# Patient Record
Sex: MALE | Race: OTHER | HISPANIC OR LATINO | URBAN - METROPOLITAN AREA
[De-identification: names, ages, dates, MRNs, and addresses within clinical notes are randomized per-mention and may not be internally consistent; named-entity substitution may affect disease eponyms.]

---

## 2019-01-22 ENCOUNTER — OUTPATIENT (OUTPATIENT)
Dept: OUTPATIENT SERVICES | Facility: HOSPITAL | Age: 44
LOS: 1 days | End: 2019-01-22
Payer: COMMERCIAL

## 2019-01-22 VITALS
TEMPERATURE: 98 F | HEART RATE: 59 BPM | OXYGEN SATURATION: 96 % | HEIGHT: 71 IN | DIASTOLIC BLOOD PRESSURE: 66 MMHG | SYSTOLIC BLOOD PRESSURE: 125 MMHG | RESPIRATION RATE: 16 BRPM | WEIGHT: 287.92 LBS

## 2019-01-22 DIAGNOSIS — R94.39 ABNORMAL RESULT OF OTHER CARDIOVASCULAR FUNCTION STUDY: ICD-10-CM

## 2019-01-22 DIAGNOSIS — S43.006A UNSPECIFIED DISLOCATION OF UNSPECIFIED SHOULDER JOINT, INITIAL ENCOUNTER: Chronic | ICD-10-CM

## 2019-01-22 DIAGNOSIS — T14.8XXA OTHER INJURY OF UNSPECIFIED BODY REGION, INITIAL ENCOUNTER: Chronic | ICD-10-CM

## 2019-01-22 LAB
ALBUMIN SERPL ELPH-MCNC: 4.2 G/DL — SIGNIFICANT CHANGE UP (ref 3.3–5)
ALP SERPL-CCNC: 52 U/L — SIGNIFICANT CHANGE UP (ref 40–120)
ALT FLD-CCNC: 23 U/L — SIGNIFICANT CHANGE UP (ref 10–45)
ANION GAP SERPL CALC-SCNC: 11 MMOL/L — SIGNIFICANT CHANGE UP (ref 5–17)
ANION GAP SERPL CALC-SCNC: 13 MMOL/L — SIGNIFICANT CHANGE UP (ref 5–17)
AST SERPL-CCNC: 39 U/L — SIGNIFICANT CHANGE UP (ref 10–40)
BILIRUB SERPL-MCNC: 0.4 MG/DL — SIGNIFICANT CHANGE UP (ref 0.2–1.2)
BUN SERPL-MCNC: 23 MG/DL — SIGNIFICANT CHANGE UP (ref 7–23)
BUN SERPL-MCNC: 23 MG/DL — SIGNIFICANT CHANGE UP (ref 7–23)
CALCIUM SERPL-MCNC: 9.3 MG/DL — SIGNIFICANT CHANGE UP (ref 8.4–10.5)
CALCIUM SERPL-MCNC: 9.3 MG/DL — SIGNIFICANT CHANGE UP (ref 8.4–10.5)
CHLORIDE SERPL-SCNC: 100 MMOL/L — SIGNIFICANT CHANGE UP (ref 96–108)
CHLORIDE SERPL-SCNC: 101 MMOL/L — SIGNIFICANT CHANGE UP (ref 96–108)
CO2 SERPL-SCNC: 28 MMOL/L — SIGNIFICANT CHANGE UP (ref 22–31)
CO2 SERPL-SCNC: 33 MMOL/L — HIGH (ref 22–31)
CREAT SERPL-MCNC: 1.19 MG/DL — SIGNIFICANT CHANGE UP (ref 0.5–1.3)
CREAT SERPL-MCNC: 1.36 MG/DL — HIGH (ref 0.5–1.3)
GLUCOSE SERPL-MCNC: 94 MG/DL — SIGNIFICANT CHANGE UP (ref 70–99)
GLUCOSE SERPL-MCNC: 97 MG/DL — SIGNIFICANT CHANGE UP (ref 70–99)
HCT VFR BLD CALC: 40 % — SIGNIFICANT CHANGE UP (ref 39–50)
HGB BLD-MCNC: 13.9 G/DL — SIGNIFICANT CHANGE UP (ref 13–17)
MCHC RBC-ENTMCNC: 30.2 PG — SIGNIFICANT CHANGE UP (ref 27–34)
MCHC RBC-ENTMCNC: 34.8 GM/DL — SIGNIFICANT CHANGE UP (ref 32–36)
MCV RBC AUTO: 87 FL — SIGNIFICANT CHANGE UP (ref 80–100)
PLATELET # BLD AUTO: 336 K/UL — SIGNIFICANT CHANGE UP (ref 150–400)
POTASSIUM SERPL-MCNC: 3.1 MMOL/L — LOW (ref 3.5–5.3)
POTASSIUM SERPL-MCNC: 4.9 MMOL/L — SIGNIFICANT CHANGE UP (ref 3.5–5.3)
POTASSIUM SERPL-SCNC: 3.1 MMOL/L — LOW (ref 3.5–5.3)
POTASSIUM SERPL-SCNC: 4.9 MMOL/L — SIGNIFICANT CHANGE UP (ref 3.5–5.3)
PROT SERPL-MCNC: 7.3 G/DL — SIGNIFICANT CHANGE UP (ref 6–8.3)
RBC # BLD: 4.6 M/UL — SIGNIFICANT CHANGE UP (ref 4.2–5.8)
RBC # FLD: 12.3 % — SIGNIFICANT CHANGE UP (ref 10.3–14.5)
SODIUM SERPL-SCNC: 142 MMOL/L — SIGNIFICANT CHANGE UP (ref 135–145)
SODIUM SERPL-SCNC: 144 MMOL/L — SIGNIFICANT CHANGE UP (ref 135–145)
WBC # BLD: 10.6 K/UL — HIGH (ref 3.8–10.5)
WBC # FLD AUTO: 10.6 K/UL — HIGH (ref 3.8–10.5)

## 2019-01-22 PROCEDURE — C1769: CPT

## 2019-01-22 PROCEDURE — 85027 COMPLETE CBC AUTOMATED: CPT

## 2019-01-22 PROCEDURE — C1887: CPT

## 2019-01-22 PROCEDURE — 93454 CORONARY ARTERY ANGIO S&I: CPT

## 2019-01-22 PROCEDURE — 93005 ELECTROCARDIOGRAM TRACING: CPT

## 2019-01-22 PROCEDURE — 93454 CORONARY ARTERY ANGIO S&I: CPT | Mod: 26,GC

## 2019-01-22 PROCEDURE — 93010 ELECTROCARDIOGRAM REPORT: CPT

## 2019-01-22 PROCEDURE — C1894: CPT

## 2019-01-22 PROCEDURE — 80053 COMPREHEN METABOLIC PANEL: CPT

## 2019-01-22 PROCEDURE — 99152 MOD SED SAME PHYS/QHP 5/>YRS: CPT | Mod: GC

## 2019-01-22 PROCEDURE — 99152 MOD SED SAME PHYS/QHP 5/>YRS: CPT

## 2019-01-22 PROCEDURE — 80048 BASIC METABOLIC PNL TOTAL CA: CPT

## 2019-01-22 RX ORDER — POTASSIUM CHLORIDE 20 MEQ
40 PACKET (EA) ORAL ONCE
Qty: 0 | Refills: 0 | Status: DISCONTINUED | OUTPATIENT
Start: 2019-01-22 | End: 2019-02-06

## 2019-01-22 NOTE — H&P CARDIOLOGY - HISTORY OF PRESENT ILLNESS
44 years old male with PMHx HTN, HLD (not started on statin), family Hx of CAD (father with CABG at age 40's) who had intermittent dyspnea at rest for 2 weeks and was evaluated by  Dr Canales and underwent stress test that shewed small sized defect in the anterior wall segment of mild intensity with stress gated hypokinesis, consistent with mild anterior ischemia. Pt referred for [facility] for further cardiac evaluation.  Pt denies any chest discomfort, dizziness, HA or LE swelling but reports 40 pound weight gain over few years.     Cards Sigifredo Comer

## 2019-04-05 ENCOUNTER — MEDICATION RENEWAL (OUTPATIENT)
Age: 44
End: 2019-04-05

## 2019-04-08 ENCOUNTER — MEDICATION RENEWAL (OUTPATIENT)
Age: 44
End: 2019-04-08

## 2019-04-08 RX ORDER — CHOLECALCIFEROL (VITAMIN D3) 50 MCG
50 MCG TABLET ORAL
Qty: 30 | Refills: 0 | Status: ACTIVE | COMMUNITY
Start: 2019-04-08 | End: 1900-01-01

## 2019-05-22 ENCOUNTER — MEDICATION RENEWAL (OUTPATIENT)
Age: 44
End: 2019-05-22

## 2019-05-29 PROBLEM — E78.5 HYPERLIPIDEMIA, UNSPECIFIED: Chronic | Status: ACTIVE | Noted: 2019-01-22

## 2019-06-19 ENCOUNTER — MEDICATION RENEWAL (OUTPATIENT)
Age: 44
End: 2019-06-19

## 2019-07-02 ENCOUNTER — LABORATORY RESULT (OUTPATIENT)
Age: 44
End: 2019-07-02

## 2019-07-02 ENCOUNTER — APPOINTMENT (OUTPATIENT)
Dept: CARDIOLOGY | Facility: CLINIC | Age: 44
End: 2019-07-02
Payer: COMMERCIAL

## 2019-07-02 VITALS
WEIGHT: 292 LBS | HEIGHT: 71 IN | HEART RATE: 63 BPM | OXYGEN SATURATION: 97 % | DIASTOLIC BLOOD PRESSURE: 120 MMHG | SYSTOLIC BLOOD PRESSURE: 160 MMHG | BODY MASS INDEX: 40.88 KG/M2

## 2019-07-02 DIAGNOSIS — R53.83 OTHER FATIGUE: ICD-10-CM

## 2019-07-02 DIAGNOSIS — H11.30 CONJUNCTIVAL HEMORRHAGE, UNSPECIFIED EYE: ICD-10-CM

## 2019-07-02 DIAGNOSIS — Z82.49 FAMILY HISTORY OF ISCHEMIC HEART DISEASE AND OTHER DISEASES OF THE CIRCULATORY SYSTEM: ICD-10-CM

## 2019-07-02 DIAGNOSIS — F17.210 NICOTINE DEPENDENCE, CIGARETTES, UNCOMPLICATED: ICD-10-CM

## 2019-07-02 PROCEDURE — 99214 OFFICE O/P EST MOD 30 MIN: CPT

## 2019-07-02 PROCEDURE — 93000 ELECTROCARDIOGRAM COMPLETE: CPT

## 2019-07-02 NOTE — PHYSICAL EXAM
[General Appearance - Well Developed] : well developed [Normal Appearance] : normal appearance [Well Groomed] : well groomed [General Appearance - Well Nourished] : well nourished [No Deformities] : no deformities [General Appearance - In No Acute Distress] : no acute distress [Eyelids - No Xanthelasma] : the eyelids demonstrated no xanthelasmas [Normal Oral Mucosa] : normal oral mucosa [No Oral Cyanosis] : no oral cyanosis [No Oral Pallor] : no oral pallor [Normal Jugular Venous A Waves Present] : normal jugular venous A waves present [Normal Jugular Venous V Waves Present] : normal jugular venous V waves present [No Jugular Venous Muñiz A Waves] : no jugular venous muñiz A waves [Heart Rate And Rhythm] : heart rate and rhythm were normal [Heart Sounds] : normal S1 and S2 [Respiration, Rhythm And Depth] : normal respiratory rhythm and effort [Exaggerated Use Of Accessory Muscles For Inspiration] : no accessory muscle use [Auscultation Breath Sounds / Voice Sounds] : lungs were clear to auscultation bilaterally [Abdomen Soft] : soft [Abdomen Tenderness] : non-tender [Abdomen Mass (___ Cm)] : no abdominal mass palpated [Abnormal Walk] : normal gait [Nail Clubbing] : no clubbing of the fingernails [Gait - Sufficient For Exercise Testing] : the gait was sufficient for exercise testing [Cyanosis, Localized] : no localized cyanosis [Petechial Hemorrhages (___cm)] : no petechial hemorrhages [Skin Color & Pigmentation] : normal skin color and pigmentation [] : no rash [No Venous Stasis] : no venous stasis [Skin Lesions] : no skin lesions [No Skin Ulcers] : no skin ulcer [Oriented To Time, Place, And Person] : oriented to person, place, and time [No Xanthoma] : no  xanthoma was observed [Mood] : the mood was normal [Affect] : the affect was normal [No Anxiety] : not feeling anxious [FreeTextEntry1] : Non-displaced PMI, chest non-tender; regular rate and rhythm,  normal S1, S2 , no rubs, or gallops, Grade 2/6 holosystolic murmur @ LSB w/o radiation.

## 2019-07-02 NOTE — HISTORY OF PRESENT ILLNESS
[FreeTextEntry1] : The patient here for evaluation of high blood pressure. Patient is currently tolerating the current antihypertensive regime and they deny headaches, stiff neck, visual changes, or PND. The patient has been trying to stay on a low-sodium diet. Pt has not taken minoxidil or toprol for 1 week. \par Patient also presents today for follow-up of obesity. The patient states they have not lost significant weight since the last visit. Patient is currently obese and remains sedentary. The patient has not been compliant with medical follow-up instructions for weight-loss and exercise since the last visit.\par The patient is also here for complaints  of joint pain including back and knee pain.  Patient denies any severe limitations currently from the chronic arthritic intermittent flareups.\par Pt states on Sunday he noticed burst blood vessel in right eye. Denies pain, decreased or blurry vision. \par

## 2019-07-05 LAB
25(OH)D3 SERPL-MCNC: 33.4 NG/ML
ALBUMIN SERPL ELPH-MCNC: 4.6 G/DL
ALP BLD-CCNC: 68 U/L
ALT SERPL-CCNC: 18 U/L
ANION GAP SERPL CALC-SCNC: 13 MMOL/L
APPEARANCE: CLEAR
AST SERPL-CCNC: 13 U/L
BACTERIA: NEGATIVE
BASOPHILS # BLD AUTO: 0.07 K/UL
BASOPHILS NFR BLD AUTO: 0.7 %
BILIRUB SERPL-MCNC: 0.4 MG/DL
BILIRUBIN URINE: NEGATIVE
BLOOD URINE: NEGATIVE
BUN SERPL-MCNC: 13 MG/DL
CALCIUM SERPL-MCNC: 9.5 MG/DL
CHLORIDE SERPL-SCNC: 103 MMOL/L
CHOLEST SERPL-MCNC: 156 MG/DL
CHOLEST/HDLC SERPL: 3.8 RATIO
CO2 SERPL-SCNC: 28 MMOL/L
COLOR: YELLOW
CREAT SERPL-MCNC: 1.3 MG/DL
EOSINOPHIL # BLD AUTO: 0.21 K/UL
EOSINOPHIL NFR BLD AUTO: 2.1 %
ESTIMATED AVERAGE GLUCOSE: 94 MG/DL
FERRITIN SERPL-MCNC: 164 NG/ML
FOLATE RBC-MCNC: 1179 NG/ML
FOLATE SERPL-MCNC: 13.2 NG/ML
GLUCOSE QUALITATIVE U: NEGATIVE
GLUCOSE SERPL-MCNC: 98 MG/DL
HAPTOGLOB SERPL-MCNC: 159 MG/DL
HBA1C MFR BLD HPLC: 4.9 %
HCT VFR BLD CALC: 43.1 %
HCT VFR BLD CALC: 43.1 %
HDLC SERPL-MCNC: 41 MG/DL
HGB BLD-MCNC: 14.4 G/DL
HYALINE CASTS: 1 /LPF
IMM GRANULOCYTES NFR BLD AUTO: 0.5 %
IRON SERPL-MCNC: 60 UG/DL
KETONES URINE: NEGATIVE
LDH SERPL-CCNC: 189 U/L
LDLC SERPL CALC-MCNC: 83 MG/DL
LEUKOCYTE ESTERASE URINE: NEGATIVE
LYMPHOCYTES # BLD AUTO: 1.91 K/UL
LYMPHOCYTES NFR BLD AUTO: 18.7 %
MAGNESIUM SERPL-MCNC: 2.2 MG/DL
MAN DIFF?: NORMAL
MCHC RBC-ENTMCNC: 30 PG
MCHC RBC-ENTMCNC: 33.4 GM/DL
MCV RBC AUTO: 89.8 FL
MICROSCOPIC-UA: NORMAL
MONOCYTES # BLD AUTO: 0.71 K/UL
MONOCYTES NFR BLD AUTO: 6.9 %
NEUTROPHILS # BLD AUTO: 7.28 K/UL
NEUTROPHILS NFR BLD AUTO: 71.1 %
NITRITE URINE: NEGATIVE
PH URINE: 6
PHOSPHATE SERPL-MCNC: 3.2 MG/DL
PLATELET # BLD AUTO: 331 K/UL
POTASSIUM SERPL-SCNC: 3.7 MMOL/L
PROT SERPL-MCNC: 7.2 G/DL
PROTEIN URINE: ABNORMAL
PSA SERPL-MCNC: 0.22 NG/ML
RBC # BLD: 4.8 M/UL
RBC # FLD: 14.1 %
RED BLOOD CELLS URINE: 2 /HPF
SODIUM SERPL-SCNC: 144 MMOL/L
SPECIFIC GRAVITY URINE: 1.02
SQUAMOUS EPITHELIAL CELLS: 1 /HPF
T3RU NFR SERPL: 1 TBI
T4 FREE SERPL-MCNC: 1.3 NG/DL
T4 SERPL-MCNC: 7.6 UG/DL
TRANSFERRIN SERPL-MCNC: 233 MG/DL
TRIGL SERPL-MCNC: 158 MG/DL
TSH SERPL-ACNC: 1.6 UIU/ML
URATE SERPL-MCNC: 7.8 MG/DL
UROBILINOGEN URINE: NORMAL
VIT B12 SERPL-MCNC: 402 PG/ML
WBC # FLD AUTO: 10.23 K/UL
WHITE BLOOD CELLS URINE: 1 /HPF

## 2019-07-08 LAB
VIT B1 SERPL-MCNC: 150.9 NMOL/L
VIT B6 SERPL-MCNC: 14.6 UG/L

## 2019-07-12 ENCOUNTER — APPOINTMENT (OUTPATIENT)
Dept: SURGERY | Facility: CLINIC | Age: 44
End: 2019-07-12
Payer: COMMERCIAL

## 2019-07-12 VITALS
HEIGHT: 71 IN | HEART RATE: 64 BPM | DIASTOLIC BLOOD PRESSURE: 128 MMHG | BODY MASS INDEX: 39.76 KG/M2 | TEMPERATURE: 98.5 F | WEIGHT: 284 LBS | OXYGEN SATURATION: 98 % | SYSTOLIC BLOOD PRESSURE: 203 MMHG | RESPIRATION RATE: 15 BRPM

## 2019-07-12 DIAGNOSIS — Z78.9 OTHER SPECIFIED HEALTH STATUS: ICD-10-CM

## 2019-07-12 PROCEDURE — 99244 OFF/OP CNSLTJ NEW/EST MOD 40: CPT

## 2019-07-12 NOTE — PLAN
[FreeTextEntry1] : I have reviewed all surgical and nonsurgical options with the patient, who would like to proceed with sleeve gastrectomy. Based on my assessment, I feel he is a reasonable candidate for laparoscopic sleeve gastrectomy. We have reviewed all the risks and benefits, including the risk of bleeding, leak, infection, sepsis, malnutrition, reflux, vomiting, and weight regain. We have also discussed the need for  lifelong vitamin supplementation, as well as the need for strict adherence to an appropriate bariatric diet postoperatively and maintaining an active lifestyle. I reviewed the importance of behavioral modification and regular follow-up in order to optimize outcomes and avoid complications.  We discussed at length the requirement for him to quit smoking in order to decrease perioperative complications.  He is willing to do this.  All questions and concerns were addressed. \par The patient will undergo laparoscopic sleeve gastrectomy after completion of medical weight management program.\par Requires:\par [] medical clearance\par [] cardiology clearance\par [] pulmonary clearance\par [] UGIS\par [] upper endoscopy\par

## 2019-07-12 NOTE — CONSULT LETTER
[Dear  ___] : Dear  [unfilled], [Please see my note below.] : Please see my note below. [Consult Letter:] : I had the pleasure of evaluating your patient, [unfilled]. [Consult Closing:] : Thank you very much for allowing me to participate in the care of this patient.  If you have any questions, please do not hesitate to contact me. [Sincerely,] : Sincerely, [FreeTextEntry2] : Dr. Sigifredo Canales [FreeTextEntry3] : Van Chow MD, FACS\par [facility]\par [address]\[place] [address]\par tel. [phone]\par fax [phone] [FreeTextEntry1] : As you know, he is a very pleasant 44-year-old gentleman who has been struggling with obesity and is interested in bariatric surgery.  I think he would be a reasonable candidate for sleeve gastrectomy.  He understands that he must take an effort to quit smoking.

## 2019-07-12 NOTE — ASSESSMENT
[FreeTextEntry1] : The patient is a 44 year-old gentleman with BMI of 40 kg/m2, which places him in the morbidly obese category.  This has directly contributed to his current medical conditions as well as a decreased quality of life.  Comorbidities include hypertension.  He has very little chance of successfully losing and maintaining a significant amount of weight with non-surgical management, and would benefit from surgical intervention.  The patient meets the criteria for weight loss surgery as defined in the NIH consensus statement.

## 2019-07-12 NOTE — REVIEW OF SYSTEMS
[Negative] : Allergic/Immunologic [Lower Ext Edema] : lower extremity edema [Shortness Of Breath] : shortness of breath

## 2019-07-12 NOTE — REASON FOR VISIT
[Initial Consult] : an initial consult for [Morbid Obesity (BMI>40)] : morbid obesity (bmi>40) [FreeTextEntry2] : referred by Dr. Sigifredo Canales

## 2019-07-12 NOTE — HISTORY OF PRESENT ILLNESS
[de-identified] : Balta is a 45 y/o male here for a consultation for weight loss surgery.\par \par The patient has been struggling with obesity for many years, since his 20s.  He has attempted several diet and exercise programs without success.  The excess weight is starting to significantly affect his health and lifestyle.  He has experienced worsening joint pain, shortness of breath, and fatigue.  He has hypertension that is poorly controlled. \par He is an active smoker, smoking 4-5 cigarettes per day.  He drinks a case of beer on weekends.  He works as a .\par \par Medical history is significant for hypertension and cardiac murmur.\par Surgical history is significant for surgery.\par The patient reports no prior history of blood clot or abnormal bleeding.\par The patient has no prior history of dysphagia.\par \par Referred by Dr. Sigifredo Doherty.\par

## 2019-07-12 NOTE — PHYSICAL EXAM
[Obese, well nourished, in no acute distress] : obese, well nourished, in no acute distress [Normal] : affect appropriate [de-identified] : Breathing comfortably [de-identified] : Soft, nontender, obese.

## 2019-07-15 ENCOUNTER — APPOINTMENT (OUTPATIENT)
Dept: CARDIOLOGY | Facility: CLINIC | Age: 44
End: 2019-07-15
Payer: COMMERCIAL

## 2019-07-15 PROCEDURE — 93306 TTE W/DOPPLER COMPLETE: CPT

## 2019-08-08 ENCOUNTER — APPOINTMENT (OUTPATIENT)
Dept: CARDIOLOGY | Facility: CLINIC | Age: 44
End: 2019-08-08
Payer: COMMERCIAL

## 2019-08-08 ENCOUNTER — NON-APPOINTMENT (OUTPATIENT)
Age: 44
End: 2019-08-08

## 2019-08-08 VITALS
SYSTOLIC BLOOD PRESSURE: 160 MMHG | DIASTOLIC BLOOD PRESSURE: 90 MMHG | BODY MASS INDEX: 39.62 KG/M2 | HEIGHT: 71 IN | WEIGHT: 283 LBS | OXYGEN SATURATION: 97 % | HEART RATE: 59 BPM

## 2019-08-08 PROCEDURE — 93000 ELECTROCARDIOGRAM COMPLETE: CPT

## 2019-08-08 PROCEDURE — 99213 OFFICE O/P EST LOW 20 MIN: CPT

## 2019-08-09 ENCOUNTER — APPOINTMENT (OUTPATIENT)
Dept: PULMONOLOGY | Facility: CLINIC | Age: 44
End: 2019-08-09

## 2019-08-17 NOTE — HISTORY OF PRESENT ILLNESS
[FreeTextEntry1] : I was asked to see this delightful patient today for Pre-op Evaluation  prior to  bariatric surgery with   Dr. Van Bernal at fax number [phone].  The patient denies fever, cough, wheezing, sputum production, hemoptysis, dyspnea, congestion, diarrhea, constipation, nausea, vomiting, bright red blood per rectum, melena, angina, chest pain, palpitations, diaphoresis, PND, incontinence, frequency, urgency, dysuria, edema, joint pain, headache, weakness, numbness and dizziness. The date of the planned procedure is scheduled for February/March 2019.\par \par The patient here for evaluation of high blood pressure. Patient is currently tolerating the current antihypertensive regime and they deny headaches, stiff neck, visual changes, or PND. The patient has been trying to stay on a low-sodium diet.\par \par Nuclear stress test was done in Spring 2019\par \par

## 2019-08-17 NOTE — PHYSICAL EXAM
[General Appearance - Well Developed] : well developed [Normal Appearance] : normal appearance [No Deformities] : no deformities [General Appearance - Well Nourished] : well nourished [Well Groomed] : well groomed [Normal Conjunctiva] : the conjunctiva exhibited no abnormalities [General Appearance - In No Acute Distress] : no acute distress [Eyelids - No Xanthelasma] : the eyelids demonstrated no xanthelasmas [Normal Oral Mucosa] : normal oral mucosa [No Oral Pallor] : no oral pallor [No Oral Cyanosis] : no oral cyanosis [Normal Jugular Venous V Waves Present] : normal jugular venous V waves present [Normal Jugular Venous A Waves Present] : normal jugular venous A waves present [No Jugular Venous Muñiz A Waves] : no jugular venous muñiz A waves [Respiration, Rhythm And Depth] : normal respiratory rhythm and effort [Exaggerated Use Of Accessory Muscles For Inspiration] : no accessory muscle use [Auscultation Breath Sounds / Voice Sounds] : lungs were clear to auscultation bilaterally [Heart Rate And Rhythm] : heart rate and rhythm were normal [Heart Sounds] : normal S1 and S2 [Murmurs] : no murmurs present [Abdomen Soft] : soft [Abdomen Tenderness] : non-tender [Abdomen Mass (___ Cm)] : no abdominal mass palpated [Abnormal Walk] : normal gait [Gait - Sufficient For Exercise Testing] : the gait was sufficient for exercise testing [Nail Clubbing] : no clubbing of the fingernails [Cyanosis, Localized] : no localized cyanosis [Petechial Hemorrhages (___cm)] : no petechial hemorrhages [Skin Color & Pigmentation] : normal skin color and pigmentation [No Venous Stasis] : no venous stasis [] : no rash [No Skin Ulcers] : no skin ulcer [Skin Lesions] : no skin lesions [Oriented To Time, Place, And Person] : oriented to person, place, and time [No Xanthoma] : no  xanthoma was observed [Affect] : the affect was normal [Mood] : the mood was normal [No Anxiety] : not feeling anxious [FreeTextEntry1] : Regular rate and rhythm, NL S1, S2, non-displaced PMI, chest non-tender; no rubs,heaves  or gallops a  Grade 2/6 systolic murmur noted at the LSB

## 2019-10-09 ENCOUNTER — EMERGENCY (EMERGENCY)
Facility: HOSPITAL | Age: 44
LOS: 0 days | Discharge: HOME | End: 2019-10-09
Attending: EMERGENCY MEDICINE
Payer: OTHER MISCELLANEOUS

## 2019-10-09 VITALS
WEIGHT: 276.02 LBS | RESPIRATION RATE: 16 BRPM | TEMPERATURE: 99 F | OXYGEN SATURATION: 96 % | DIASTOLIC BLOOD PRESSURE: 122 MMHG | SYSTOLIC BLOOD PRESSURE: 182 MMHG | HEIGHT: 71 IN | HEART RATE: 73 BPM

## 2019-10-09 VITALS
OXYGEN SATURATION: 99 % | SYSTOLIC BLOOD PRESSURE: 183 MMHG | RESPIRATION RATE: 17 BRPM | TEMPERATURE: 99 F | HEART RATE: 62 BPM | DIASTOLIC BLOOD PRESSURE: 103 MMHG

## 2019-10-09 DIAGNOSIS — S43.006A UNSPECIFIED DISLOCATION OF UNSPECIFIED SHOULDER JOINT, INITIAL ENCOUNTER: Chronic | ICD-10-CM

## 2019-10-09 DIAGNOSIS — M25.571 PAIN IN RIGHT ANKLE AND JOINTS OF RIGHT FOOT: ICD-10-CM

## 2019-10-09 DIAGNOSIS — F17.210 NICOTINE DEPENDENCE, CIGARETTES, UNCOMPLICATED: ICD-10-CM

## 2019-10-09 DIAGNOSIS — I10 ESSENTIAL (PRIMARY) HYPERTENSION: ICD-10-CM

## 2019-10-09 DIAGNOSIS — M10.9 GOUT, UNSPECIFIED: ICD-10-CM
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
## 2019-10-09 DIAGNOSIS — S93.492A SPRAIN OF OTHER LIGAMENT OF LEFT ANKLE, INITIAL ENCOUNTER: ICD-10-CM
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
## 2019-10-09 DIAGNOSIS — T14.8XXA OTHER INJURY OF UNSPECIFIED BODY REGION, INITIAL ENCOUNTER: Chronic | ICD-10-CM

## 2019-10-09 PROCEDURE — 99283 EMERGENCY DEPT VISIT LOW MDM: CPT

## 2019-10-09 PROCEDURE — 73610 X-RAY EXAM OF ANKLE: CPT | Mod: 26,RT

## 2019-10-09 RX ORDER — NICOTINE POLACRILEX 2 MG
1 GUM BUCCAL
Qty: 30 | Refills: 0
Start: 2019-10-09 | End: 2019-11-07

## 2019-10-09 RX ORDER — IBUPROFEN 200 MG
600 TABLET ORAL ONCE
Refills: 0 | Status: COMPLETED | OUTPATIENT
Start: 2019-10-09 | End: 2019-10-09

## 2019-10-09 RX ADMIN — Medication 600 MILLIGRAM(S): at 09:26

## 2019-10-09 NOTE — ED PROVIDER NOTE - PATIENT PORTAL LINK FT
You can access the FollowMyHealth Patient Portal offered by [facility] by registering at the following website: [email or web address]. By joining [facility]’s FollowMyHealth portal, you will also be able to view your health information using other applications (apps) compatible with our system.

## 2019-10-09 NOTE — ED PROVIDER NOTE - OBJECTIVE STATEMENT
Pt is a 45 y/o male with a PMHx of HTN and HLD, who presents to the ER for ankle pain/injury. Pt states Pt is a 45 y/o male with a PMHx of HTN and HLD, and gout  who presents to the ER for ankle pain/injury. 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 He states the pain began as a 7/10, and has worsened to a 9/10 as of today. He also notes edema in the LLE, which has worsened since Monday. +Smoker, denies use of other illict drugs or h/o alcohol abuse.

## 2019-10-09 NOTE — ED PROVIDER NOTE - RELIEVING FACTORS
Detail Level: Simple Initiate Treatment: Metronidozale BID, sulfacleanse BID, Doxycycline 75mg daily x 1 month, then 1/2 tab daily from there on out Action 2: Continue Continue Regimen: Fluocinonide solution for scalp as needed Detail Level: Zone Discontinue Regimen: Enstilar Initiate Regimen: Clobetasol ointment for hands and elbows as needed none

## 2019-10-09 NOTE — ED ADULT NURSE NOTE - NSIMPLEMENTINTERV_GEN_ALL_ED
Implemented All Universal Safety Interventions:  [place] to call system. Call bell, personal items and telephone within reach. Instruct patient to call for assistance. Room bathroom lighting operational. Non-slip footwear when patient is off stretcher. Physically safe environment: no spills, clutter or unnecessary equipment. Stretcher in lowest position, wheels locked, appropriate side rails in place.

## 2019-10-09 NOTE — ED PROVIDER NOTE - CLINICAL SUMMARY MEDICAL DECISION MAKING FREE TEXT BOX
Most likely ankle sprain. R/o fx   Pt BP uncontrolled, not on the first line for BP medication. Start HCTZ. Recommend smoking cessation. No symptoms secondary to end stage organ damage Most likely ankle sprain. R/o fx   Pt BP uncontrolled, not on the first line for BP medication. Start HCTZ. Recommend smoking cessation. No signs of end stage organ damage 2/2 to elevated bp, no further work up indicated. Most likely ankle sprain. R/o fx   Pt BP uncontrolled, not on the first line for BP medication. Start HCTZ. Recommend smoking cessation. No signs of end stage organ damage 2/2 to elevated bp, no further work up indicated.  xray neg. dc home

## 2019-10-09 NOTE — ED PROVIDER NOTE - NSFOLLOWUPINSTRUCTIONS_ED_ALL_ED_FT
Ice the ankle.     Rest the ankle. Ice the ankle.     Rest the ankle.    Start taking your new blood pressure medication AT NIGHT.    In fact, switch another blood pressure medication to the night as well so you have smoother control.    Avoid smoking!

## 2019-10-24 ENCOUNTER — NON-APPOINTMENT (OUTPATIENT)
Age: 44
End: 2019-10-24

## 2019-10-24 ENCOUNTER — APPOINTMENT (OUTPATIENT)
Dept: CARDIOLOGY | Facility: CLINIC | Age: 44
End: 2019-10-24
Payer: COMMERCIAL

## 2019-10-24 VITALS
BODY MASS INDEX: 39.62 KG/M2 | SYSTOLIC BLOOD PRESSURE: 140 MMHG | HEART RATE: 65 BPM | HEIGHT: 71 IN | OXYGEN SATURATION: 97 % | DIASTOLIC BLOOD PRESSURE: 100 MMHG | WEIGHT: 283 LBS

## 2019-10-24 DIAGNOSIS — R80.9 PROTEINURIA, UNSPECIFIED: ICD-10-CM

## 2019-10-24 LAB
ALBUMIN SERPL ELPH-MCNC: 4.3 G/DL
ALP BLD-CCNC: 75 U/L
ALT SERPL-CCNC: 11 U/L
ANION GAP SERPL CALC-SCNC: 13 MMOL/L
AST SERPL-CCNC: 12 U/L
BASOPHILS # BLD AUTO: 0.08 K/UL
BASOPHILS NFR BLD AUTO: 0.7 %
BILIRUB SERPL-MCNC: 0.3 MG/DL
BUN SERPL-MCNC: 24 MG/DL
CALCIUM SERPL-MCNC: 9.2 MG/DL
CHLORIDE SERPL-SCNC: 102 MMOL/L
CHOLEST SERPL-MCNC: 157 MG/DL
CHOLEST/HDLC SERPL: 4.4 RATIO
CO2 SERPL-SCNC: 29 MMOL/L
CREAT SERPL-MCNC: 1.25 MG/DL
EOSINOPHIL # BLD AUTO: 0.36 K/UL
EOSINOPHIL NFR BLD AUTO: 3.3 %
ESTIMATED AVERAGE GLUCOSE: 100 MG/DL
GLUCOSE SERPL-MCNC: 98 MG/DL
HBA1C MFR BLD HPLC: 5.1 %
HCT VFR BLD CALC: 40.8 %
HDLC SERPL-MCNC: 36 MG/DL
HGB BLD-MCNC: 13.5 G/DL
IMM GRANULOCYTES NFR BLD AUTO: 0.6 %
LDLC SERPL CALC-MCNC: 77 MG/DL
LYMPHOCYTES # BLD AUTO: 2.19 K/UL
LYMPHOCYTES NFR BLD AUTO: 20.4 %
MAGNESIUM SERPL-MCNC: 2.2 MG/DL
MAN DIFF?: NORMAL
MCHC RBC-ENTMCNC: 29.7 PG
MCHC RBC-ENTMCNC: 33.1 GM/DL
MCV RBC AUTO: 89.9 FL
MONOCYTES # BLD AUTO: 1 K/UL
MONOCYTES NFR BLD AUTO: 9.3 %
NEUTROPHILS # BLD AUTO: 7.06 K/UL
NEUTROPHILS NFR BLD AUTO: 65.7 %
PHOSPHATE SERPL-MCNC: 3.7 MG/DL
PLATELET # BLD AUTO: 345 K/UL
POTASSIUM SERPL-SCNC: 3.2 MMOL/L
PROT SERPL-MCNC: 6.8 G/DL
RBC # BLD: 4.54 M/UL
RBC # FLD: 14.4 %
SODIUM SERPL-SCNC: 144 MMOL/L
TRIGL SERPL-MCNC: 218 MG/DL
WBC # FLD AUTO: 10.75 K/UL

## 2019-10-24 PROCEDURE — 99214 OFFICE O/P EST MOD 30 MIN: CPT

## 2019-10-24 PROCEDURE — 93000 ELECTROCARDIOGRAM COMPLETE: CPT

## 2019-10-24 NOTE — PHYSICAL EXAM
[General Appearance - Well Developed] : well developed [Normal Appearance] : normal appearance [General Appearance - Well Nourished] : well nourished [No Deformities] : no deformities [Well Groomed] : well groomed [Normal Conjunctiva] : the conjunctiva exhibited no abnormalities [General Appearance - In No Acute Distress] : no acute distress [Normal Oral Mucosa] : normal oral mucosa [Eyelids - No Xanthelasma] : the eyelids demonstrated no xanthelasmas [No Oral Pallor] : no oral pallor [No Oral Cyanosis] : no oral cyanosis [No Jugular Venous Muñiz A Waves] : no jugular venous muñiz A waves [Normal Jugular Venous A Waves Present] : normal jugular venous A waves present [Normal Jugular Venous V Waves Present] : normal jugular venous V waves present [Respiration, Rhythm And Depth] : normal respiratory rhythm and effort [Exaggerated Use Of Accessory Muscles For Inspiration] : no accessory muscle use [Auscultation Breath Sounds / Voice Sounds] : lungs were clear to auscultation bilaterally [Heart Rate And Rhythm] : heart rate and rhythm were normal [Heart Sounds] : normal S1 and S2 [Abdomen Soft] : soft [Abdomen Tenderness] : non-tender [Abnormal Walk] : normal gait [Gait - Sufficient For Exercise Testing] : the gait was sufficient for exercise testing [Abdomen Mass (___ Cm)] : no abdominal mass palpated [Nail Clubbing] : no clubbing of the fingernails [Cyanosis, Localized] : no localized cyanosis [Petechial Hemorrhages (___cm)] : no petechial hemorrhages [Skin Color & Pigmentation] : normal skin color and pigmentation [No Venous Stasis] : no venous stasis [Skin Lesions] : no skin lesions [] : no rash [No Skin Ulcers] : no skin ulcer [No Xanthoma] : no  xanthoma was observed [Affect] : the affect was normal [Oriented To Time, Place, And Person] : oriented to person, place, and time [No Anxiety] : not feeling anxious [Mood] : the mood was normal

## 2019-10-25 LAB
ALDOSTERONE SERUM: 16.1 NG/DL
APPEARANCE: CLEAR
BACTERIA: NEGATIVE
BILIRUBIN URINE: NEGATIVE
BLOOD URINE: NEGATIVE
COLOR: NORMAL
GLUCOSE QUALITATIVE U: NEGATIVE
HYALINE CASTS: 0 /LPF
KETONES URINE: NEGATIVE
LEUKOCYTE ESTERASE URINE: NEGATIVE
MICROSCOPIC-UA: NORMAL
NITRITE URINE: NEGATIVE
PH URINE: 6
PROTEIN URINE: NEGATIVE
RED BLOOD CELLS URINE: 0 /HPF
RENIN PLASMA: 2.4 PG/ML
SPECIFIC GRAVITY URINE: 1.02
SQUAMOUS EPITHELIAL CELLS: 0 /HPF
UROBILINOGEN URINE: NORMAL
WHITE BLOOD CELLS URINE: 0 /HPF

## 2019-10-27 NOTE — HISTORY OF PRESENT ILLNESS
[FreeTextEntry1] : The patient here for evaluation of high blood pressure. Patient is currently tolerating the current antihypertensive regime and they deny headaches, stiff neck, visual changes, or PND. The patient has been trying to stay on a low-sodium diet. Pt admits he does not monitor his blood pressure at home. He states he was in the emergency room a few weeks ago for an ankle injury and his blood pressure was systolic 180. He states they added Hydralazine to his regimen. \par Patient also presents today for follow-up of obesity. The patient states they have not lost significant weight since the last visit. Patient is currently obese and remains sedentary. The patient has not been compliant with medical follow-up instructions for weight-loss and exercise since the last visit.\par Pt isn't sure he wants to follow through with bariatric surgery. He states he has been going to weight watchers and has lost 7 lbs. \par Pt states he has cut down on his smoking to a couple cigarettes a week.

## 2019-10-31 LAB
METANEPHRINE, PL: <10 PG/ML
NORMETANEPHRINE, PL: 25 PG/ML

## 2019-11-01 LAB
METANEPHRINE, PL: <10 PG/ML
NORMETANEPHRINE, PL: 151 PG/ML

## 2019-11-19 ENCOUNTER — APPOINTMENT (OUTPATIENT)
Dept: CARDIOLOGY | Facility: CLINIC | Age: 44
End: 2019-11-19

## 2019-11-22 ENCOUNTER — APPOINTMENT (OUTPATIENT)
Dept: NEPHROLOGY | Facility: CLINIC | Age: 44
End: 2019-11-22
Payer: COMMERCIAL

## 2019-11-22 VITALS — BODY MASS INDEX: 39.05 KG/M2 | WEIGHT: 279.98 LBS

## 2019-11-22 VITALS — DIASTOLIC BLOOD PRESSURE: 90 MMHG | SYSTOLIC BLOOD PRESSURE: 140 MMHG

## 2019-11-22 PROCEDURE — 99204 OFFICE O/P NEW MOD 45 MIN: CPT

## 2019-11-22 NOTE — PHYSICAL EXAM
[General Appearance - Alert] : alert [General Appearance - In No Acute Distress] : in no acute distress [General Appearance - Well Nourished] : well nourished [General Appearance - Well Developed] : well developed [Sclera] : the sclera and conjunctiva were normal [Examination Of The Oral Cavity] : the lips and gums were normal [Oropharynx] : the oropharynx was normal [Neck Appearance] : the appearance of the neck was normal [Neck Cervical Mass (___cm)] : no neck mass was observed [Jugular Venous Distention Increased] : there was no jugular-venous distention [Respiration, Rhythm And Depth] : normal respiratory rhythm and effort [Exaggerated Use Of Accessory Muscles For Inspiration] : no accessory muscle use [Auscultation Breath Sounds / Voice Sounds] : lungs were clear to auscultation bilaterally [Heart Sounds] : normal S1 and S2 [Heart Sounds Gallop] : no gallops [Murmurs] : no murmurs [Heart Sounds Pericardial Friction Rub] : no pericardial rub [Edema] : there was no peripheral edema [Bowel Sounds] : normal bowel sounds [Abdomen Soft] : soft [Abdomen Tenderness] : non-tender [FreeTextEntry1] : obese [Cervical Lymph Nodes Enlarged Posterior Bilaterally] : posterior cervical [Cervical Lymph Nodes Enlarged Anterior Bilaterally] : anterior cervical [Supraclavicular Lymph Nodes Enlarged Bilaterally] : supraclavicular [Abnormal Walk] : normal gait [] : no rash [Oriented To Time, Place, And Person] : oriented to person, place, and time [Impaired Insight] : insight and judgment were intact [Affect] : the affect was normal [Mood] : the mood was normal

## 2019-11-22 NOTE — HISTORY OF PRESENT ILLNESS
[FreeTextEntry1] : Today I  had the pleasure of meeting Balta Stovall.  He is a 44 years old man who was raised in [place] and has worked driving a truck for many years.  A few years back he was diagnosed with hypertension.  His father  young of a heart attack and some of his siblings and his mother have hypertension.  He recently established care with Dr. Canales where a work up for secondary causes of hypertension were mostly unrevealing (except a slightly elevated metanephrine).  The patient is overweight, he uses alcohol on the weekend and admits to having a high salt diet.  On my evaluation today he reports no chest pain no shortness of breath no nausea or vomiting or diarrhea, no palpitations, no panic attacks.

## 2019-11-22 NOTE — ASSESSMENT
[FreeTextEntry1] : Balta Stovall is a 44 years old man here for evaluation of hypertension\par \par Hypertension -- The patient's elevated blood pressure may have an essential component however his use of alcohol, high salt diet, and weight are playing a role in driving up his blood pressure.  There are no stigmata of pheohcromocytoma and the signficance of  the plasma metanephrines is unclear and could be related to stress / alcohol use medication use.  I strongly recommended dietary salt restriction.  We reviewed the importance of weight loss and the role of fat cells in producing "salt avid enzymes."  I requested he return in April to check in on his dietary / life style modifications.  \par \par Proteinruia -- The patient had a low level of proteinuria on dip.  will quantitate today.  Likely related to glomerular hyperfiltration from obesit and hypertension.  will monitor.

## 2019-11-22 NOTE — REVIEW OF SYSTEMS
[Fever] : no fever [Chills] : no chills [Feeling Poorly] : not feeling poorly [Feeling Tired] : not feeling tired [Eyesight Problems] : no eyesight problems [Nosebleeds] : no nosebleeds [Chest Pain] : no chest pain [Palpitations] : no palpitations [Leg Claudication] : no intermittent leg claudication [Lower Ext Edema] : no extremity edema [Shortness Of Breath] : no shortness of breath [Wheezing] : no wheezing [Abdominal Pain] : no abdominal pain [Vomiting] : no vomiting [Dysuria] : no dysuria [Incontinence] : no incontinence [Hesitancy] : no urinary hesitancy [Arthralgias] : no arthralgias [Joint Pain] : no joint pain [Itching] : no itching [Dizziness] : no dizziness [Fainting] : no fainting [Anxiety] : no anxiety [Depression] : no depression [Easy Bleeding] : no tendency for easy bleeding [Easy Bruising] : no tendency for easy bruising

## 2019-11-25 LAB
APPEARANCE: CLEAR
BACTERIA: NEGATIVE
BILIRUBIN URINE: NEGATIVE
BLOOD URINE: NEGATIVE
COLOR: NORMAL
CREAT SPEC-SCNC: 161 MG/DL
CREAT/PROT UR: 0.1 RATIO
ESTIMATED AVERAGE GLUCOSE: 97 MG/DL
GLUCOSE QUALITATIVE U: NEGATIVE
HBA1C MFR BLD HPLC: 5 %
HYALINE CASTS: 0 /LPF
KETONES URINE: NEGATIVE
LEUKOCYTE ESTERASE URINE: NEGATIVE
MICROSCOPIC-UA: NORMAL
NITRITE URINE: NEGATIVE
PH URINE: 6
PROT UR-MCNC: 18 MG/DL
PROTEIN URINE: NORMAL
RED BLOOD CELLS URINE: 3 /HPF
SPECIFIC GRAVITY URINE: 1.02
SQUAMOUS EPITHELIAL CELLS: 0 /HPF
URATE SERPL-MCNC: 7 MG/DL
UROBILINOGEN URINE: NORMAL
WHITE BLOOD CELLS URINE: 0 /HPF

## 2020-01-03 ENCOUNTER — APPOINTMENT (OUTPATIENT)
Dept: PULMONOLOGY | Facility: CLINIC | Age: 45
End: 2020-01-03

## 2020-01-30 ENCOUNTER — APPOINTMENT (OUTPATIENT)
Dept: CARDIOLOGY | Facility: CLINIC | Age: 45
End: 2020-01-30

## 2020-01-31 ENCOUNTER — APPOINTMENT (OUTPATIENT)
Dept: PULMONOLOGY | Facility: CLINIC | Age: 45
End: 2020-01-31

## 2020-02-06 ENCOUNTER — APPOINTMENT (OUTPATIENT)
Dept: CARDIOLOGY | Facility: CLINIC | Age: 45
End: 2020-02-06

## 2020-03-04 ENCOUNTER — APPOINTMENT (OUTPATIENT)
Dept: CARDIOLOGY | Facility: CLINIC | Age: 45
End: 2020-03-04

## 2020-03-20 RX ORDER — LOSARTAN POTASSIUM 100 MG/1
100 TABLET, FILM COATED ORAL
Qty: 90 | Refills: 1 | Status: DISCONTINUED | COMMUNITY
Start: 2019-05-22 | End: 2020-03-20

## 2020-04-01 ENCOUNTER — APPOINTMENT (OUTPATIENT)
Dept: CARDIOLOGY | Facility: CLINIC | Age: 45
End: 2020-04-01

## 2020-04-03 ENCOUNTER — APPOINTMENT (OUTPATIENT)
Dept: NEPHROLOGY | Facility: CLINIC | Age: 45
End: 2020-04-03

## 2020-04-23 ENCOUNTER — APPOINTMENT (OUTPATIENT)
Dept: CARDIOLOGY | Facility: CLINIC | Age: 45
End: 2020-04-23
Payer: COMMERCIAL

## 2020-04-23 PROCEDURE — 99214 OFFICE O/P EST MOD 30 MIN: CPT | Mod: 95

## 2020-04-23 RX ORDER — MINOXIDIL 2.5 MG/1
2.5 TABLET ORAL
Qty: 360 | Refills: 1 | Status: DISCONTINUED | COMMUNITY
Start: 2019-07-02 | End: 2020-04-23

## 2020-04-23 RX ORDER — LOSARTAN POTASSIUM 50 MG/1
50 TABLET, FILM COATED ORAL DAILY
Qty: 180 | Refills: 1 | Status: DISCONTINUED | COMMUNITY
Start: 2020-03-20 | End: 2020-04-23

## 2020-04-23 RX ORDER — HYDROCHLOROTHIAZIDE 25 MG/1
25 TABLET ORAL
Qty: 90 | Refills: 1 | Status: DISCONTINUED | COMMUNITY
Start: 2019-10-24 | End: 2020-04-23

## 2020-04-23 RX ORDER — METOPROLOL SUCCINATE 100 MG/1
100 TABLET, EXTENDED RELEASE ORAL
Qty: 90 | Refills: 1 | Status: DISCONTINUED | COMMUNITY
Start: 2019-07-02 | End: 2020-04-23

## 2020-04-23 RX ORDER — DILTIAZEM HYDROCHLORIDE 360 MG/1
360 CAPSULE, EXTENDED RELEASE ORAL DAILY
Qty: 90 | Refills: 1 | Status: DISCONTINUED | COMMUNITY
Start: 2019-04-08 | End: 2020-04-23

## 2020-04-24 NOTE — HISTORY OF PRESENT ILLNESS
[Home] : at home, [unfilled] , at the time of the visit. [Medical Office: ([facility])___] : at the medical office located in  [Patient] : the patient [FreeTextEntry2] : Balta Stovall [FreeTextEntry1] : Pt states he was at [facility] on 03/13 for diverticulitis/ sepsis/ Rhabdomyolysis. He states he had afever of 102 and was in the hospital for 12 days. Pt states he is improving.Pt was seeing renal Dr. Alford at the hospital. Pt states he was taken off his blood pressure medications in the hospital because his blood pressure was uncontrolled up to systolic 200s. Pt states they started him on Labetalol 100mg BID, Hydralazine 100mg TID, Nifedipine 60mg qd which was controlling his BP well. However, they advised him at the hospital to only be on the labetalol for 14 days. SInce stopping the labetalol his blood pressure has increased again. This morning his systolic was 170 and then when he retook it his BP was 140/90.\par Pt lost 30 lbs

## 2020-04-28 ENCOUNTER — NON-APPOINTMENT (OUTPATIENT)
Age: 45
End: 2020-04-28

## 2020-04-28 ENCOUNTER — APPOINTMENT (OUTPATIENT)
Dept: CARDIOLOGY | Facility: CLINIC | Age: 45
End: 2020-04-28
Payer: COMMERCIAL

## 2020-04-28 VITALS
HEIGHT: 71 IN | WEIGHT: 279 LBS | DIASTOLIC BLOOD PRESSURE: 70 MMHG | OXYGEN SATURATION: 98 % | TEMPERATURE: 97.9 F | HEART RATE: 66 BPM | SYSTOLIC BLOOD PRESSURE: 112 MMHG | BODY MASS INDEX: 39.06 KG/M2

## 2020-04-28 PROCEDURE — 93000 ELECTROCARDIOGRAM COMPLETE: CPT

## 2020-04-28 PROCEDURE — 99213 OFFICE O/P EST LOW 20 MIN: CPT

## 2020-04-30 LAB
ALBUMIN SERPL ELPH-MCNC: 4.4 G/DL
ALP BLD-CCNC: 56 U/L
ALT SERPL-CCNC: 16 U/L
ANION GAP SERPL CALC-SCNC: 14 MMOL/L
AST SERPL-CCNC: 14 U/L
BASOPHILS # BLD AUTO: 0.05 K/UL
BASOPHILS NFR BLD AUTO: 0.7 %
BILIRUB DIRECT SERPL-MCNC: 0.1 MG/DL
BILIRUB INDIRECT SERPL-MCNC: 0.2 MG/DL
BILIRUB SERPL-MCNC: 0.3 MG/DL
BUN SERPL-MCNC: 17 MG/DL
CALCIUM SERPL-MCNC: 9.3 MG/DL
CHLORIDE SERPL-SCNC: 104 MMOL/L
CHOLEST SERPL-MCNC: 181 MG/DL
CHOLEST/HDLC SERPL: 5 RATIO
CK SERPL-CCNC: 129 U/L
CO2 SERPL-SCNC: 26 MMOL/L
CREAT SERPL-MCNC: 1.5 MG/DL
EOSINOPHIL # BLD AUTO: 0.19 K/UL
EOSINOPHIL NFR BLD AUTO: 2.5 %
ESTIMATED AVERAGE GLUCOSE: 97 MG/DL
GLUCOSE SERPL-MCNC: 99 MG/DL
HBA1C MFR BLD HPLC: 5 %
HCT VFR BLD CALC: 33 %
HDLC SERPL-MCNC: 36 MG/DL
HGB BLD-MCNC: 10.9 G/DL
IMM GRANULOCYTES NFR BLD AUTO: 0.4 %
LDLC SERPL CALC-MCNC: 115 MG/DL
LDLC SERPL DIRECT ASSAY-MCNC: 120 MG/DL
LYMPHOCYTES # BLD AUTO: 1.63 K/UL
LYMPHOCYTES NFR BLD AUTO: 21.8 %
MAN DIFF?: NORMAL
MCHC RBC-ENTMCNC: 29.9 PG
MCHC RBC-ENTMCNC: 33 GM/DL
MCV RBC AUTO: 90.7 FL
MONOCYTES # BLD AUTO: 0.77 K/UL
MONOCYTES NFR BLD AUTO: 10.3 %
NEUTROPHILS # BLD AUTO: 4.79 K/UL
NEUTROPHILS NFR BLD AUTO: 64.3 %
PLATELET # BLD AUTO: 286 K/UL
POTASSIUM SERPL-SCNC: 3.5 MMOL/L
PROT SERPL-MCNC: 6.9 G/DL
RBC # BLD: 3.64 M/UL
RBC # FLD: 14.3 %
SODIUM SERPL-SCNC: 144 MMOL/L
TRIGL SERPL-MCNC: 149 MG/DL
WBC # FLD AUTO: 7.46 K/UL

## 2020-05-11 NOTE — PHYSICAL EXAM
[General Appearance - Well Developed] : well developed [Normal Appearance] : normal appearance [General Appearance - Well Nourished] : well nourished [Well Groomed] : well groomed [No Deformities] : no deformities [General Appearance - In No Acute Distress] : no acute distress [Normal Conjunctiva] : the conjunctiva exhibited no abnormalities [Eyelids - No Xanthelasma] : the eyelids demonstrated no xanthelasmas [Normal Oral Mucosa] : normal oral mucosa [No Oral Pallor] : no oral pallor [No Oral Cyanosis] : no oral cyanosis [Normal Jugular Venous V Waves Present] : normal jugular venous V waves present [No Jugular Venous Muñiz A Waves] : no jugular venous muñiz A waves [Normal Jugular Venous A Waves Present] : normal jugular venous A waves present [Respiration, Rhythm And Depth] : normal respiratory rhythm and effort [Auscultation Breath Sounds / Voice Sounds] : lungs were clear to auscultation bilaterally [Exaggerated Use Of Accessory Muscles For Inspiration] : no accessory muscle use [Abdomen Soft] : soft [Abdomen Tenderness] : non-tender [Abnormal Walk] : normal gait [Abdomen Mass (___ Cm)] : no abdominal mass palpated [Gait - Sufficient For Exercise Testing] : the gait was sufficient for exercise testing [Nail Clubbing] : no clubbing of the fingernails [Petechial Hemorrhages (___cm)] : no petechial hemorrhages [Cyanosis, Localized] : no localized cyanosis [Skin Color & Pigmentation] : normal skin color and pigmentation [] : no rash [No Venous Stasis] : no venous stasis [Skin Lesions] : no skin lesions [No Skin Ulcers] : no skin ulcer [No Xanthoma] : no  xanthoma was observed [Mood] : the mood was normal [Affect] : the affect was normal [Oriented To Time, Place, And Person] : oriented to person, place, and time [No Anxiety] : not feeling anxious [FreeTextEntry1] : Regular rate and rhythm, NL S1, S2, non-displaced PMI, chest non-tender; no rubs,heaves  or gallops a  Grade 2/6 systolic murmur noted at the LSB

## 2020-05-11 NOTE — HISTORY OF PRESENT ILLNESS
[FreeTextEntry1] : Pt following up s/p hospital visit and emergency surgery for diverticulitis. He states that his blood pressures were uncontrolled in the hospital so they added Labetalol to his medication regimen. He states blood pressures have been controlled since adding Labetalol, he takes BPs at home which run around 110s/70s. Pt also had Rhabdomyolosis in the hospital from Daptomycin. \par Pt had colostomy placed in the hospital, planned to be reversed in July.

## 2020-06-02 ENCOUNTER — APPOINTMENT (OUTPATIENT)
Dept: CARDIOLOGY | Facility: CLINIC | Age: 45
End: 2020-06-02
Payer: COMMERCIAL

## 2020-06-02 VITALS
BODY MASS INDEX: 39.06 KG/M2 | HEIGHT: 71 IN | HEART RATE: 58 BPM | DIASTOLIC BLOOD PRESSURE: 80 MMHG | WEIGHT: 279 LBS | OXYGEN SATURATION: 97 % | SYSTOLIC BLOOD PRESSURE: 122 MMHG | TEMPERATURE: 98 F

## 2020-06-02 DIAGNOSIS — M62.82 RHABDOMYOLYSIS: ICD-10-CM

## 2020-06-02 PROCEDURE — 99214 OFFICE O/P EST MOD 30 MIN: CPT

## 2020-06-04 LAB
ALBUMIN SERPL ELPH-MCNC: 4.7 G/DL
ALP BLD-CCNC: 62 U/L
ALT SERPL-CCNC: 11 U/L
ANION GAP SERPL CALC-SCNC: 13 MMOL/L
AST SERPL-CCNC: 9 U/L
BASOPHILS # BLD AUTO: 0.06 K/UL
BASOPHILS NFR BLD AUTO: 0.7 %
BILIRUB DIRECT SERPL-MCNC: 0.1 MG/DL
BILIRUB INDIRECT SERPL-MCNC: 0.4 MG/DL
BILIRUB SERPL-MCNC: 0.5 MG/DL
BUN SERPL-MCNC: 20 MG/DL
CALCIUM SERPL-MCNC: 9.6 MG/DL
CHLORIDE SERPL-SCNC: 103 MMOL/L
CHOLEST SERPL-MCNC: 159 MG/DL
CHOLEST/HDLC SERPL: 4.9 RATIO
CK SERPL-CCNC: 125 U/L
CO2 SERPL-SCNC: 24 MMOL/L
CREAT SERPL-MCNC: 1.29 MG/DL
EOSINOPHIL # BLD AUTO: 0.16 K/UL
EOSINOPHIL NFR BLD AUTO: 1.8 %
ESTIMATED AVERAGE GLUCOSE: 85 MG/DL
GLUCOSE SERPL-MCNC: 91 MG/DL
HBA1C MFR BLD HPLC: 4.6 %
HCT VFR BLD CALC: 38.2 %
HDLC SERPL-MCNC: 32 MG/DL
HGB BLD-MCNC: 12.5 G/DL
IMM GRANULOCYTES NFR BLD AUTO: 0.4 %
LDLC SERPL CALC-MCNC: 99 MG/DL
LDLC SERPL DIRECT ASSAY-MCNC: 103 MG/DL
LYMPHOCYTES # BLD AUTO: 2.04 K/UL
LYMPHOCYTES NFR BLD AUTO: 22.7 %
MAN DIFF?: NORMAL
MCHC RBC-ENTMCNC: 29 PG
MCHC RBC-ENTMCNC: 32.7 GM/DL
MCV RBC AUTO: 88.6 FL
MONOCYTES # BLD AUTO: 0.77 K/UL
MONOCYTES NFR BLD AUTO: 8.6 %
NEUTROPHILS # BLD AUTO: 5.91 K/UL
NEUTROPHILS NFR BLD AUTO: 65.8 %
PLATELET # BLD AUTO: 342 K/UL
POTASSIUM SERPL-SCNC: 3.4 MMOL/L
PROT SERPL-MCNC: 7.6 G/DL
RBC # BLD: 4.31 M/UL
RBC # FLD: 13.6 %
SARS-COV-2 IGG SERPL IA-ACNC: 0.1 INDEX
SARS-COV-2 IGG SERPL QL IA: NEGATIVE
SODIUM SERPL-SCNC: 141 MMOL/L
TRIGL SERPL-MCNC: 139 MG/DL
WBC # FLD AUTO: 8.98 K/UL

## 2020-06-13 NOTE — HISTORY OF PRESENT ILLNESS
[FreeTextEntry1] : The patient here for evaluation of high blood pressure. Patient is currently tolerating the current antihypertensive regime and they deny headaches, stiff neck, visual changes, or PND. The patient has been trying to stay on a low-sodium diet.\par Pt had colostomy placed March 13th secondary to diverticulitis, to be reversed in July. \adelso Pt states that he is going to follow up w/ Dr. Damon nephrology for rhabdo while he was at [facility] in March.

## 2020-06-13 NOTE — PHYSICAL EXAM
[General Appearance - Well Developed] : well developed [Well Groomed] : well groomed [Normal Appearance] : normal appearance [General Appearance - Well Nourished] : well nourished [No Deformities] : no deformities [Eyelids - No Xanthelasma] : the eyelids demonstrated no xanthelasmas [Normal Conjunctiva] : the conjunctiva exhibited no abnormalities [General Appearance - In No Acute Distress] : no acute distress [No Oral Pallor] : no oral pallor [No Oral Cyanosis] : no oral cyanosis [Normal Oral Mucosa] : normal oral mucosa [No Jugular Venous Muñiz A Waves] : no jugular venous muñiz A waves [Normal Jugular Venous V Waves Present] : normal jugular venous V waves present [Normal Jugular Venous A Waves Present] : normal jugular venous A waves present [Respiration, Rhythm And Depth] : normal respiratory rhythm and effort [Exaggerated Use Of Accessory Muscles For Inspiration] : no accessory muscle use [Auscultation Breath Sounds / Voice Sounds] : lungs were clear to auscultation bilaterally [Abdomen Tenderness] : non-tender [Abdomen Soft] : soft [Gait - Sufficient For Exercise Testing] : the gait was sufficient for exercise testing [Abdomen Mass (___ Cm)] : no abdominal mass palpated [Abnormal Walk] : normal gait [Petechial Hemorrhages (___cm)] : no petechial hemorrhages [Cyanosis, Localized] : no localized cyanosis [Nail Clubbing] : no clubbing of the fingernails [No Venous Stasis] : no venous stasis [Skin Color & Pigmentation] : normal skin color and pigmentation [] : no rash [No Skin Ulcers] : no skin ulcer [Skin Lesions] : no skin lesions [No Xanthoma] : no  xanthoma was observed [No Anxiety] : not feeling anxious [Mood] : the mood was normal [Oriented To Time, Place, And Person] : oriented to person, place, and time [Affect] : the affect was normal [FreeTextEntry1] : Regular rate and rhythm, NL S1, S2, non-displaced PMI, chest non-tender; no rubs,heaves  or gallops a  Grade 2/6 systolic murmur noted at the LSB

## 2020-07-28 ENCOUNTER — APPOINTMENT (OUTPATIENT)
Dept: CARDIOLOGY | Facility: CLINIC | Age: 45
End: 2020-07-28

## 2020-07-30 ENCOUNTER — NON-APPOINTMENT (OUTPATIENT)
Age: 45
End: 2020-07-30

## 2020-07-30 ENCOUNTER — APPOINTMENT (OUTPATIENT)
Dept: CARDIOLOGY | Facility: CLINIC | Age: 45
End: 2020-07-30
Payer: COMMERCIAL

## 2020-07-30 VITALS — HEART RATE: 56 BPM | DIASTOLIC BLOOD PRESSURE: 72 MMHG | SYSTOLIC BLOOD PRESSURE: 116 MMHG | OXYGEN SATURATION: 97 %

## 2020-07-30 DIAGNOSIS — Z01.818 ENCOUNTER FOR OTHER PREPROCEDURAL EXAMINATION: ICD-10-CM

## 2020-07-30 DIAGNOSIS — R94.31 ABNORMAL ELECTROCARDIOGRAM [ECG] [EKG]: ICD-10-CM

## 2020-07-30 PROCEDURE — 99214 OFFICE O/P EST MOD 30 MIN: CPT

## 2020-07-30 PROCEDURE — 93000 ELECTROCARDIOGRAM COMPLETE: CPT

## 2020-07-31 ENCOUNTER — APPOINTMENT (OUTPATIENT)
Dept: CARDIOLOGY | Facility: CLINIC | Age: 45
End: 2020-07-31
Payer: COMMERCIAL

## 2020-07-31 PROCEDURE — 93321 DOPPLER ECHO F-UP/LMTD STD: CPT

## 2020-07-31 PROCEDURE — 93325 DOPPLER ECHO COLOR FLOW MAPG: CPT

## 2020-07-31 PROCEDURE — 93351 STRESS TTE COMPLETE: CPT

## 2020-08-03 NOTE — HISTORY OF PRESENT ILLNESS
[FreeTextEntry1] : I was asked to see this delightful patient today for Pre-op Evaluation  prior to  colostomy with   Dr. del ahuja [facility]  at fax number   [phone] _______  .  The patient denies fever, cough, wheezing, sputum production, hemoptysis, dyspnea, congestion, diarrhea, constipation, nausea, vomiting, bright red blood per rectum, melena, angina, chest pain, palpitations, diaphoresis, PND, incontinence, frequency, urgency, dysuria, edema, joint pain, headache, weakness, numbness and dizziness. The date of the planned procedure is:  august 4 th, 2020

## 2020-08-03 NOTE — PHYSICAL EXAM
[General Appearance - Well Developed] : well developed [Normal Appearance] : normal appearance [Well Groomed] : well groomed [General Appearance - Well Nourished] : well nourished [No Deformities] : no deformities [General Appearance - In No Acute Distress] : no acute distress [Normal Conjunctiva] : the conjunctiva exhibited no abnormalities [Eyelids - No Xanthelasma] : the eyelids demonstrated no xanthelasmas [Normal Oral Mucosa] : normal oral mucosa [No Oral Pallor] : no oral pallor [No Oral Cyanosis] : no oral cyanosis [Normal Jugular Venous A Waves Present] : normal jugular venous A waves present [Normal Jugular Venous V Waves Present] : normal jugular venous V waves present [No Jugular Venous Muñiz A Waves] : no jugular venous muñiz A waves [Respiration, Rhythm And Depth] : normal respiratory rhythm and effort [Exaggerated Use Of Accessory Muscles For Inspiration] : no accessory muscle use [Auscultation Breath Sounds / Voice Sounds] : lungs were clear to auscultation bilaterally [Heart Rate And Rhythm] : heart rate and rhythm were normal [Heart Sounds] : normal S1 and S2 [Murmurs] : no murmurs present [Abdomen Soft] : soft [Abdomen Tenderness] : non-tender [Abdomen Mass (___ Cm)] : no abdominal mass palpated [Abnormal Walk] : normal gait [Gait - Sufficient For Exercise Testing] : the gait was sufficient for exercise testing [Nail Clubbing] : no clubbing of the fingernails [Petechial Hemorrhages (___cm)] : no petechial hemorrhages [Cyanosis, Localized] : no localized cyanosis [Skin Color & Pigmentation] : normal skin color and pigmentation [] : no rash [No Venous Stasis] : no venous stasis [Skin Lesions] : no skin lesions [No Skin Ulcers] : no skin ulcer [No Xanthoma] : no  xanthoma was observed [Oriented To Time, Place, And Person] : oriented to person, place, and time [Affect] : the affect was normal [No Anxiety] : not feeling anxious [Mood] : the mood was normal [FreeTextEntry1] : Regular rate and rhythm, NL S1, S2, non-displaced PMI, chest non-tender; no rubs,heaves  or gallops a  Grade 2/6 systolic murmur noted at the LSB

## 2020-08-06 LAB
ANION GAP SERPL CALC-SCNC: 13 MMOL/L
BUN SERPL-MCNC: 19 MG/DL
CALCIUM SERPL-MCNC: 9.2 MG/DL
CHLORIDE SERPL-SCNC: 104 MMOL/L
CO2 SERPL-SCNC: 25 MMOL/L
CREAT SERPL-MCNC: 1.53 MG/DL
GLUCOSE SERPL-MCNC: 94 MG/DL
POTASSIUM SERPL-SCNC: 3.4 MMOL/L
SODIUM SERPL-SCNC: 142 MMOL/L

## 2020-08-28 NOTE — H&P CARDIOLOGY - PSYCHIATRIC
Right radial artery. Accessed successfully. Affect and characteristics of appearance, verbalizations, behaviors are appropriate

## 2020-09-15 NOTE — ED PROVIDER NOTE - PHYSICAL EXAMINATION
Pt wearing pt mask at this time.      Bon De La Garza  09/15/20 [id number]     Gen: Alert, NAD, well appearing  Head: NC, AT, PERRL, EOMI, normal lids/conjunctiva  ENT: normal hearing, patent oropharynx without erythema/exudate, uvula midline  Neck: +supple, no tenderness/meningismus/JVD, +Trachea midline  Pulm: Bilateral BS, normal resp effort, no wheeze/stridor/retractions  CV: RRR, no M/R/G, +dist pulses  Abd: soft, NT/ND, Negative Gabbs signs, +BS, no palpable masses  Mskel: no edema/erythema/cyanosis  Skin: no rash, warm/dry  Neuro: AAOx3, no apparent sensory/motor deficits, coordination intact Gen: Alert, NAD, well appearing  Head: NC, AT, PERRL, EOMI, normal lids/conjunctiva  ENT: normal hearing, patent oropharynx without erythema/exudate, uvula midline  Neck: +supple, no tenderness/meningismus/JVD, +Trachea midline  Pulm: Bilateral BS, normal resp effort, no wheeze/stridor/retractions  CV: RRR, no M/R/G, +dist pulses  Abd: soft, NT/ND, Negative Alto signs, +BS, no palpable masses  Mskel: no erythema/cyanosis. Soft tissue swelling in the lateral malleolus. Pulses in tact   Skin: no rash, warm/dry  Neuro: AAOx3, no apparent sensory/motor deficits, coordination intact

## 2020-10-05 ENCOUNTER — APPOINTMENT (OUTPATIENT)
Dept: CARDIOLOGY | Facility: CLINIC | Age: 45
End: 2020-10-05
Payer: COMMERCIAL

## 2020-10-05 ENCOUNTER — NON-APPOINTMENT (OUTPATIENT)
Age: 45
End: 2020-10-05

## 2020-10-05 VITALS — TEMPERATURE: 98.1 F

## 2020-10-05 VITALS
OXYGEN SATURATION: 98 % | HEIGHT: 71 IN | BODY MASS INDEX: 37.1 KG/M2 | HEART RATE: 65 BPM | SYSTOLIC BLOOD PRESSURE: 120 MMHG | DIASTOLIC BLOOD PRESSURE: 80 MMHG | WEIGHT: 265 LBS

## 2020-10-05 DIAGNOSIS — Z00.00 ENCOUNTER FOR GENERAL ADULT MEDICAL EXAMINATION W/OUT ABNORMAL FINDINGS: ICD-10-CM

## 2020-10-05 DIAGNOSIS — Z29.9 ENCOUNTER FOR PROPHYLACTIC MEASURES, UNSPECIFIED: ICD-10-CM

## 2020-10-05 DIAGNOSIS — D64.9 ANEMIA, UNSPECIFIED: ICD-10-CM

## 2020-10-05 PROCEDURE — 93000 ELECTROCARDIOGRAM COMPLETE: CPT

## 2020-10-05 PROCEDURE — 99214 OFFICE O/P EST MOD 30 MIN: CPT

## 2020-10-05 RX ORDER — POTASSIUM CHLORIDE 20 MEQ/15ML
20 MEQ/15ML SOLUTION ORAL
Qty: 1 | Refills: 0 | Status: DISCONTINUED | COMMUNITY
Start: 2020-07-30 | End: 2020-10-05

## 2020-10-05 NOTE — PHYSICAL EXAM
[General Appearance - Well Developed] : well developed [Normal Appearance] : normal appearance [Well Groomed] : well groomed [General Appearance - Well Nourished] : well nourished [No Deformities] : no deformities [General Appearance - In No Acute Distress] : no acute distress [Normal Conjunctiva] : the conjunctiva exhibited no abnormalities [Eyelids - No Xanthelasma] : the eyelids demonstrated no xanthelasmas [Normal Oral Mucosa] : normal oral mucosa [No Oral Pallor] : no oral pallor [No Oral Cyanosis] : no oral cyanosis [Normal Jugular Venous A Waves Present] : normal jugular venous A waves present [Normal Jugular Venous V Waves Present] : normal jugular venous V waves present [No Jugular Venous Muñiz A Waves] : no jugular venous muñiz A waves [Respiration, Rhythm And Depth] : normal respiratory rhythm and effort [Exaggerated Use Of Accessory Muscles For Inspiration] : no accessory muscle use [Auscultation Breath Sounds / Voice Sounds] : lungs were clear to auscultation bilaterally [Heart Rate And Rhythm] : heart rate and rhythm were normal [Heart Sounds] : normal S1 and S2 [Murmurs] : no murmurs present [Abdomen Soft] : soft [Abdomen Tenderness] : non-tender [Abdomen Mass (___ Cm)] : no abdominal mass palpated [Abnormal Walk] : normal gait [Gait - Sufficient For Exercise Testing] : the gait was sufficient for exercise testing [Nail Clubbing] : no clubbing of the fingernails [Cyanosis, Localized] : no localized cyanosis [Petechial Hemorrhages (___cm)] : no petechial hemorrhages [Skin Color & Pigmentation] : normal skin color and pigmentation [] : no rash [No Venous Stasis] : no venous stasis [Skin Lesions] : no skin lesions [No Skin Ulcers] : no skin ulcer [No Xanthoma] : no  xanthoma was observed [Oriented To Time, Place, And Person] : oriented to person, place, and time [Affect] : the affect was normal [Mood] : the mood was normal [No Anxiety] : not feeling anxious [FreeTextEntry1] : Regular rate and rhythm, NL S1, S2, non-displaced PMI, chest non-tender; no rubs,heaves  or gallops a  Grade 2/6 systolic murmur noted at the LSB

## 2020-10-05 NOTE — HISTORY OF PRESENT ILLNESS
[FreeTextEntry1] : 45-year-old male status post reversal of colostomy beginning of August 2020.  Patient is status post rhabdomyolysis and diverticular perforation of the colon.  He is here for follow-up of hypertension. The patient denies fever, chills, sore throat, loss of taste or smell, muscle aches weight loss, malaise, rash, recent travel, insect bites, alteration bowel habits, headaches, weakness, abdominal  pain, bloating, changes in urination, visual disturbances, shortness of breath, chest pain, dizziness, palpitations.  Patient is also here for follow-up of hypokalemia he has not been on oral potassium for several weeks.\par

## 2020-10-07 DIAGNOSIS — D72.829 ELEVATED WHITE BLOOD CELL COUNT, UNSPECIFIED: ICD-10-CM

## 2020-10-07 LAB
25(OH)D3 SERPL-MCNC: 52.6 NG/ML
ALBUMIN SERPL ELPH-MCNC: 4.6 G/DL
ALP BLD-CCNC: 67 U/L
ALT SERPL-CCNC: 9 U/L
ANION GAP SERPL CALC-SCNC: 13 MMOL/L
AST SERPL-CCNC: 18 U/L
BASOPHILS # BLD AUTO: 0.06 K/UL
BASOPHILS NFR BLD AUTO: 0.5 %
BILIRUB SERPL-MCNC: 0.4 MG/DL
BUN SERPL-MCNC: 15 MG/DL
CALCIUM SERPL-MCNC: 9.6 MG/DL
CHLORIDE SERPL-SCNC: 102 MMOL/L
CO2 SERPL-SCNC: 27 MMOL/L
CREAT SERPL-MCNC: 1.13 MG/DL
EOSINOPHIL # BLD AUTO: 0.16 K/UL
EOSINOPHIL NFR BLD AUTO: 1.4 %
GLUCOSE SERPL-MCNC: 86 MG/DL
HCT VFR BLD CALC: 42.2 %
HGB BLD-MCNC: 14 G/DL
IMM GRANULOCYTES NFR BLD AUTO: 0.5 %
LYMPHOCYTES # BLD AUTO: 1.82 K/UL
LYMPHOCYTES NFR BLD AUTO: 16 %
MAN DIFF?: NORMAL
MCHC RBC-ENTMCNC: 29.7 PG
MCHC RBC-ENTMCNC: 33.2 GM/DL
MCV RBC AUTO: 89.4 FL
MONOCYTES # BLD AUTO: 0.72 K/UL
MONOCYTES NFR BLD AUTO: 6.3 %
NEUTROPHILS # BLD AUTO: 8.57 K/UL
NEUTROPHILS NFR BLD AUTO: 75.3 %
PLATELET # BLD AUTO: 360 K/UL
POTASSIUM SERPL-SCNC: 3.5 MMOL/L
PROT SERPL-MCNC: 7.7 G/DL
RBC # BLD: 4.72 M/UL
RBC # FLD: 14.7 %
SODIUM SERPL-SCNC: 141 MMOL/L
WBC # FLD AUTO: 11.39 K/UL

## 2021-01-05 ENCOUNTER — APPOINTMENT (OUTPATIENT)
Dept: CARDIOLOGY | Facility: CLINIC | Age: 46
End: 2021-01-05

## 2021-04-21 ENCOUNTER — APPOINTMENT (OUTPATIENT)
Dept: CARDIOLOGY | Facility: CLINIC | Age: 46
End: 2021-04-21

## 2021-05-25 ENCOUNTER — RX RENEWAL (OUTPATIENT)
Age: 46
End: 2021-05-25

## 2021-06-14 ENCOUNTER — RX RENEWAL (OUTPATIENT)
Age: 46
End: 2021-06-14

## 2021-07-13 ENCOUNTER — RX RENEWAL (OUTPATIENT)
Age: 46
End: 2021-07-13

## 2021-08-02 ENCOUNTER — APPOINTMENT (OUTPATIENT)
Dept: CARDIOLOGY | Facility: CLINIC | Age: 46
End: 2021-08-02
Payer: COMMERCIAL

## 2021-08-02 ENCOUNTER — NON-APPOINTMENT (OUTPATIENT)
Age: 46
End: 2021-08-02

## 2021-08-02 VITALS
HEIGHT: 71 IN | HEART RATE: 65 BPM | BODY MASS INDEX: 35.7 KG/M2 | SYSTOLIC BLOOD PRESSURE: 118 MMHG | OXYGEN SATURATION: 98 % | WEIGHT: 255 LBS | TEMPERATURE: 98.1 F | DIASTOLIC BLOOD PRESSURE: 60 MMHG

## 2021-08-02 DIAGNOSIS — R01.1 CARDIAC MURMUR, UNSPECIFIED: ICD-10-CM

## 2021-08-02 DIAGNOSIS — R35.0 FREQUENCY OF MICTURITION: ICD-10-CM

## 2021-08-02 DIAGNOSIS — E78.00 PURE HYPERCHOLESTEROLEMIA, UNSPECIFIED: ICD-10-CM

## 2021-08-02 LAB
25(OH)D3 SERPL-MCNC: 52.9 NG/ML
ANION GAP SERPL CALC-SCNC: 12 MMOL/L
APPEARANCE: CLEAR
BACTERIA: NEGATIVE
BASOPHILS # BLD AUTO: 0.08 K/UL
BASOPHILS NFR BLD AUTO: 0.7 %
BILIRUBIN URINE: NEGATIVE
BLOOD URINE: NEGATIVE
BUN SERPL-MCNC: 17 MG/DL
CALCIUM SERPL-MCNC: 9.4 MG/DL
CHLORIDE SERPL-SCNC: 104 MMOL/L
CHOLEST SERPL-MCNC: 145 MG/DL
CO2 SERPL-SCNC: 26 MMOL/L
COLOR: YELLOW
COVID-19 SPIKE DOMAIN ANTIBODY INTERPRETATION: POSITIVE
CREAT SERPL-MCNC: 1.18 MG/DL
EOSINOPHIL # BLD AUTO: 0.3 K/UL
EOSINOPHIL NFR BLD AUTO: 2.5 %
ESTIMATED AVERAGE GLUCOSE: 103 MG/DL
GLUCOSE QUALITATIVE U: NEGATIVE
GLUCOSE SERPL-MCNC: 97 MG/DL
HBA1C MFR BLD HPLC: 5.2 %
HCT VFR BLD CALC: 39.8 %
HDLC SERPL-MCNC: 34 MG/DL
HGB BLD-MCNC: 13.6 G/DL
HYALINE CASTS: 2 /LPF
IMM GRANULOCYTES NFR BLD AUTO: 0.6 %
KETONES URINE: NEGATIVE
LDLC SERPL CALC-MCNC: 65 MG/DL
LDLC SERPL DIRECT ASSAY-MCNC: 79 MG/DL
LEUKOCYTE ESTERASE URINE: NEGATIVE
LYMPHOCYTES # BLD AUTO: 2.35 K/UL
LYMPHOCYTES NFR BLD AUTO: 19.9 %
MAGNESIUM SERPL-MCNC: 2.2 MG/DL
MAN DIFF?: NORMAL
MCHC RBC-ENTMCNC: 31 PG
MCHC RBC-ENTMCNC: 34.2 GM/DL
MCV RBC AUTO: 90.7 FL
MICROSCOPIC-UA: NORMAL
MONOCYTES # BLD AUTO: 0.92 K/UL
MONOCYTES NFR BLD AUTO: 7.8 %
NEUTROPHILS # BLD AUTO: 8.1 K/UL
NEUTROPHILS NFR BLD AUTO: 68.5 %
NITRITE URINE: NEGATIVE
NONHDLC SERPL-MCNC: 111 MG/DL
PH URINE: 6
PHOSPHATE SERPL-MCNC: 2.8 MG/DL
PLATELET # BLD AUTO: 312 K/UL
POTASSIUM SERPL-SCNC: 3.2 MMOL/L
PROTEIN URINE: NORMAL
PSA SERPL-MCNC: 0.25 NG/ML
RBC # BLD: 4.39 M/UL
RBC # FLD: 14 %
RED BLOOD CELLS URINE: 1 /HPF
SARS-COV-2 AB SERPL IA-ACNC: 143 U/ML
SODIUM SERPL-SCNC: 142 MMOL/L
SPECIFIC GRAVITY URINE: 1.02
SQUAMOUS EPITHELIAL CELLS: 0 /HPF
TRIGL SERPL-MCNC: 228 MG/DL
UROBILINOGEN URINE: NORMAL
WBC # FLD AUTO: 11.82 K/UL
WHITE BLOOD CELLS URINE: 1 /HPF

## 2021-08-02 PROCEDURE — 99214 OFFICE O/P EST MOD 30 MIN: CPT

## 2021-08-02 PROCEDURE — 93000 ELECTROCARDIOGRAM COMPLETE: CPT

## 2021-08-02 NOTE — HISTORY OF PRESENT ILLNESS
[FreeTextEntry1] : Balta is a 46 y.o male with a PMHx of Diverticulitis s/p colostomy s/p reversal 8/2020, HTN, current smoker, Obesity, Vitamin D Deficiency. Feels well.\par \par Does reports urinary frequency. Did go to [facility] for dysuria 7/23/2021, US renal/bladder performed, L kidney upper pole cyst 1.5 x 1.3 x 1.2, L lower pole cyst 1.2 cm. Was treated with abx, dysuria resolved.\par \par The patient here for evaluation of high blood pressure. Patient is currently tolerating the current antihypertensive regime and they deny headaches, stiff neck, visual changes, or PND. The patient has been trying to stay on a low-sodium diet.\par  \par Patient also presents today for follow-up of obesity. The patient states they have not lost significant weight since the last visit. Patient is currently obese and remains sedentary. The patient has not been compliant with medical follow-up instructions for weight-loss and exercise since the last visit.\par  \par Patient states that they have received Pfizer for COVID19 vaccination and have completed their vaccination series. Patient denies any ADR i.e Fever/Chills, HA, myalgia, arthralgia, anosmia, ageusia.  \par \par Denies chest pain, palpitations, diaphoresis, n/v/d/c/reflux, HA, dizziness, syncope, cough, wheezing, SOB, FA[employer]CHILD, fever, chills, infection, fatigue, swelling, myalgia, arthralgia, weight changes, changes in medications, recent trauma, travel. \par \par

## 2021-08-03 ENCOUNTER — NON-APPOINTMENT (OUTPATIENT)
Age: 46
End: 2021-08-03

## 2021-08-03 LAB — APO LP(A) SERPL-MCNC: 30.6 NMOL/L

## 2021-08-05 LAB
ALBUMIN SERPL ELPH-MCNC: 4.1 G/DL
ALDOSTERONE SERUM: 12.3 NG/DL
ANION GAP SERPL CALC-SCNC: 11 MMOL/L
BUN SERPL-MCNC: 16 MG/DL
CALCIUM SERPL-MCNC: 9.8 MG/DL
CHLORIDE SERPL-SCNC: 102 MMOL/L
CO2 SERPL-SCNC: 28 MMOL/L
CREAT SERPL-MCNC: 1.12 MG/DL
GLUCOSE SERPL-MCNC: 118 MG/DL
PHOSPHATE SERPL-MCNC: 2.8 MG/DL
POTASSIUM SERPL-SCNC: 3.2 MMOL/L
SODIUM SERPL-SCNC: 141 MMOL/L

## 2021-08-13 ENCOUNTER — RX RENEWAL (OUTPATIENT)
Age: 46
End: 2021-08-13

## 2021-09-09 ENCOUNTER — NON-APPOINTMENT (OUTPATIENT)
Age: 46
End: 2021-09-09

## 2021-09-09 LAB — RENIN ACTIVITY, PLASMA: <0.167 NG/ML/HR

## 2021-10-15 ENCOUNTER — OUTPATIENT (OUTPATIENT)
Dept: OUTPATIENT SERVICES | Facility: HOSPITAL | Age: 46
LOS: 1 days | End: 2021-10-15
Payer: SELF-PAY

## 2021-10-15 ENCOUNTER — NON-APPOINTMENT (OUTPATIENT)
Age: 46
End: 2021-10-15

## 2021-10-15 ENCOUNTER — APPOINTMENT (OUTPATIENT)
Dept: CT IMAGING | Facility: CLINIC | Age: 46
End: 2021-10-15
Payer: SELF-PAY

## 2021-10-15 DIAGNOSIS — S43.006A UNSPECIFIED DISLOCATION OF UNSPECIFIED SHOULDER JOINT, INITIAL ENCOUNTER: Chronic | ICD-10-CM

## 2021-10-15 DIAGNOSIS — E78.00 PURE HYPERCHOLESTEROLEMIA, UNSPECIFIED: ICD-10-CM

## 2021-10-15 DIAGNOSIS — T14.8XXA OTHER INJURY OF UNSPECIFIED BODY REGION, INITIAL ENCOUNTER: Chronic | ICD-10-CM

## 2021-10-15 PROCEDURE — 75571 CT HRT W/O DYE W/CA TEST: CPT

## 2021-10-15 PROCEDURE — 75571 CT HRT W/O DYE W/CA TEST: CPT | Mod: 26

## 2021-10-20 ENCOUNTER — APPOINTMENT (OUTPATIENT)
Dept: CARDIOLOGY | Facility: CLINIC | Age: 46
End: 2021-10-20

## 2021-10-27 ENCOUNTER — APPOINTMENT (OUTPATIENT)
Dept: CARDIOLOGY | Facility: CLINIC | Age: 46
End: 2021-10-27
Payer: COMMERCIAL

## 2021-10-27 PROCEDURE — 93306 TTE W/DOPPLER COMPLETE: CPT

## 2021-11-19 ENCOUNTER — APPOINTMENT (OUTPATIENT)
Dept: CT IMAGING | Facility: IMAGING CENTER | Age: 46
End: 2021-11-19
Payer: COMMERCIAL

## 2021-11-19 ENCOUNTER — OUTPATIENT (OUTPATIENT)
Dept: OUTPATIENT SERVICES | Facility: HOSPITAL | Age: 46
LOS: 1 days | End: 2021-11-19
Payer: COMMERCIAL

## 2021-11-19 DIAGNOSIS — I10 ESSENTIAL (PRIMARY) HYPERTENSION: ICD-10-CM

## 2021-11-19 DIAGNOSIS — T14.8XXA OTHER INJURY OF UNSPECIFIED BODY REGION, INITIAL ENCOUNTER: Chronic | ICD-10-CM

## 2021-11-19 DIAGNOSIS — Z00.8 ENCOUNTER FOR OTHER GENERAL EXAMINATION: ICD-10-CM

## 2021-11-19 DIAGNOSIS — S43.006A UNSPECIFIED DISLOCATION OF UNSPECIFIED SHOULDER JOINT, INITIAL ENCOUNTER: Chronic | ICD-10-CM

## 2021-11-19 PROCEDURE — 74150 CT ABDOMEN W/O CONTRAST: CPT | Mod: 26

## 2021-11-19 PROCEDURE — 74150 CT ABDOMEN W/O CONTRAST: CPT

## 2021-11-24 ENCOUNTER — NON-APPOINTMENT (OUTPATIENT)
Age: 46
End: 2021-11-24

## 2021-11-27 ENCOUNTER — APPOINTMENT (OUTPATIENT)
Dept: CT IMAGING | Facility: IMAGING CENTER | Age: 46
End: 2021-11-27
Payer: COMMERCIAL

## 2021-11-27 ENCOUNTER — OUTPATIENT (OUTPATIENT)
Dept: OUTPATIENT SERVICES | Facility: HOSPITAL | Age: 46
LOS: 1 days | End: 2021-11-27
Payer: COMMERCIAL

## 2021-11-27 DIAGNOSIS — S43.006A UNSPECIFIED DISLOCATION OF UNSPECIFIED SHOULDER JOINT, INITIAL ENCOUNTER: Chronic | ICD-10-CM

## 2021-11-27 DIAGNOSIS — Z00.8 ENCOUNTER FOR OTHER GENERAL EXAMINATION: ICD-10-CM

## 2021-11-27 DIAGNOSIS — T14.8XXA OTHER INJURY OF UNSPECIFIED BODY REGION, INITIAL ENCOUNTER: Chronic | ICD-10-CM

## 2021-11-27 PROCEDURE — 71250 CT THORAX DX C-: CPT | Mod: 26

## 2021-11-27 PROCEDURE — 71250 CT THORAX DX C-: CPT

## 2021-12-01 ENCOUNTER — APPOINTMENT (OUTPATIENT)
Dept: PULMONOLOGY | Facility: CLINIC | Age: 46
End: 2021-12-01
Payer: COMMERCIAL

## 2021-12-01 ENCOUNTER — NON-APPOINTMENT (OUTPATIENT)
Age: 46
End: 2021-12-01

## 2021-12-01 ENCOUNTER — RX RENEWAL (OUTPATIENT)
Age: 46
End: 2021-12-01

## 2021-12-01 VITALS — HEART RATE: 70 BPM | TEMPERATURE: 97.2 F | OXYGEN SATURATION: 94 %

## 2021-12-01 PROCEDURE — 99204 OFFICE O/P NEW MOD 45 MIN: CPT | Mod: 25

## 2021-12-01 PROCEDURE — 71046 X-RAY EXAM CHEST 2 VIEWS: CPT

## 2021-12-01 PROCEDURE — 36415 COLL VENOUS BLD VENIPUNCTURE: CPT

## 2021-12-01 PROCEDURE — 99407 BEHAV CHNG SMOKING > 10 MIN: CPT

## 2021-12-01 NOTE — DISCUSSION/SUMMARY
[FreeTextEntry1] : Balta is a patient with a recurrent cough likely secondary to infectious bronchiolitis.  Secondly, he is an active cigarette smoker.

## 2021-12-01 NOTE — ASSESSMENT
[FreeTextEntry1] : Venipuncture with labs drawn in office\par 15 minutes spent in smoking cessation counseling. Educated patient on deleterious effects and all potential consequences of smoking. Counseled patient on various smoking cessation techniques.\par Start Z-Epi \par Also starting him on Asmanex 200 mcg HFA, 2 puffs twice daily at this point. \par Check pulmonary function test for further evaluation.

## 2021-12-01 NOTE — PROCEDURE
[FreeTextEntry1] : \par  Xray Chest 2 Views PA/Lat             Final\par \par No Documents Attached\par \par \par \par \par   \par Chest x-ray PA and lateral views performed in my office today showed clear lungs, no evidence of infiltrates or pleural effusions. \par \par \par  Ordered by: ILYA BRAMBILA       Collected/Examined: 01Dec2021 01:42PM       \par Verified by: ILYA BRAMBILA 01Dec2021 01:44PM       \par  Result Communication: No patient communication needed at this time;\par Stage: Final       \par  Performed at: In Office       Performed by: ILYA BRAMBILA       Resulted: 01Dec2021 01:42PM       Last Updated: 01Dec2021 01:44PM       Accession: [id number]       \par \par \par   CT Chest No Cont             Final\par \par No Documents Attached\par \par \par \par \par   EXAM:  CT CHEST\par \par \par PROCEDURE DATE:  11/27/2021\par \par \par \par INTERPRETATION:  CLINICAL INFORMATION: Pneumonia, follow-up.\par \par COMPARISON: CT chest 10/15/2021 and CT abdomen/pelvis 11/19/2021\par \par CONTRAST/COMPLICATIONS:\par IV Contrast: NONE\par Oral Contrast: NONE\par Complications: None reported at time of study completion\par \par PROCEDURE:\par CT of the Chest was performed.\par Sagittal and coronal reformats were performed.\par \par FINDINGS:\par \par LUNGS AND AIRWAYS: Patent central airways.  Left lower lobe linear opacity, compatible with platelike atelectasis. A few new adjacent tree-in-bud opacities in the left lower lobe, consistent with distal impacted airways.\par PLEURA: No pleural effusion.\par MEDIASTINUM AND OSMIN: No lymphadenopathy. Mild benign-appearing residual thymic tissue.\par VESSELS: Within normal limits.\par HEART: Heart size is normal. No pericardial effusion.\par CHEST WALL AND LOWER NECK: Within normal limits.\par VISUALIZED UPPER ABDOMEN: Subcentimeter right hepatic hypodensity, too small to characterize. Left renal cyst. Nonobstructing left renal calculi measuring up to 4 mm.\par BONES: Within normal limits.\par \par IMPRESSION:\par Left lower lobe platelike atelectasis/scarring. A few new adjacent tree-in-bud opacities in the left lower lobe, consistent with impacted bronchioles. Infectious bronchiolitis is a consideration in the appropriate clinical setting.\par \par \par \par --- End of Report ---\par \par \par \par \par \par KARY BANG MD; Resident Radiology\par This document has been electronically signed.\par STEPHON BOSS M.D., ATTENDING RADIOGIST\par This document has been electronically signed. Nov 29 2021 10:51AM\par \par  \par \par  Ordered by: QUIRINO SANCHEZ       Collected/Examined: 27Nov2021 08:52AM       \par Verified by: DL HOLLAND 01Dec2021 09:40AM       \par  Result Communication: Discussed results with patient;\par Stage: Final       \par  Performed at: [facility]       Resulted: 29Nov2021 09:05AM       Last Updated: 01Dec2021 09:40AM       Accession: [id number]

## 2021-12-01 NOTE — CONSULT LETTER
[Dear  ___] : Dear  [unfilled], [Courtesy Letter:] : I had the pleasure of seeing your patient, [unfilled], in my office today. [Please see my note below.] : Please see my note below. [Consult Closing:] : Thank you very much for allowing me to participate in the care of this patient.  If you have any questions, please do not hesitate to contact me. [Sincerely,] : Sincerely, [FreeTextEntry3] : Dr. Nohemy Tripathi

## 2021-12-01 NOTE — HISTORY OF PRESENT ILLNESS
[Former] : former [TextBox_4] : Balta is a pleasant 46-year-old gentleman with history of hypertension, active cigarette smoking, he recently had cough and body aches, was then seen by [facility] and was diagnosed with walking pneumonia, he was given doxycycline and Tessalon Perles.  His cough initially improved, then it recurred yesterday, he has no chest pain, shortness of breath, fever or chills. [TextBox_11] : 1 [TextBox_13] : 27

## 2021-12-02 ENCOUNTER — APPOINTMENT (OUTPATIENT)
Dept: CARDIOLOGY | Facility: CLINIC | Age: 46
End: 2021-12-02

## 2021-12-02 LAB
ALBUMIN SERPL ELPH-MCNC: 4.7 G/DL
ALP BLD-CCNC: 64 U/L
ALT SERPL-CCNC: 23 U/L
ANION GAP SERPL CALC-SCNC: 16 MMOL/L
AST SERPL-CCNC: 15 U/L
BASOPHILS # BLD AUTO: 0.08 K/UL
BASOPHILS NFR BLD AUTO: 0.8 %
BILIRUB SERPL-MCNC: 0.5 MG/DL
BUN SERPL-MCNC: 17 MG/DL
CALCIUM SERPL-MCNC: 9.2 MG/DL
CHLORIDE SERPL-SCNC: 101 MMOL/L
CO2 SERPL-SCNC: 24 MMOL/L
CREAT SERPL-MCNC: 1.23 MG/DL
EOSINOPHIL # BLD AUTO: 0.26 K/UL
EOSINOPHIL NFR BLD AUTO: 2.4 %
ERYTHROCYTE [SEDIMENTATION RATE] IN BLOOD BY WESTERGREN METHOD: 37 MM/HR
GLUCOSE SERPL-MCNC: 82 MG/DL
HCT VFR BLD CALC: 41.2 %
HGB BLD-MCNC: 14.1 G/DL
IMM GRANULOCYTES NFR BLD AUTO: 0.5 %
LYMPHOCYTES # BLD AUTO: 1.89 K/UL
LYMPHOCYTES NFR BLD AUTO: 17.7 %
MAN DIFF?: NORMAL
MCHC RBC-ENTMCNC: 30.4 PG
MCHC RBC-ENTMCNC: 34.2 GM/DL
MCV RBC AUTO: 88.8 FL
MONOCYTES # BLD AUTO: 0.77 K/UL
MONOCYTES NFR BLD AUTO: 7.2 %
NEUTROPHILS # BLD AUTO: 7.61 K/UL
NEUTROPHILS NFR BLD AUTO: 71.4 %
PLATELET # BLD AUTO: 335 K/UL
POTASSIUM SERPL-SCNC: 3.2 MMOL/L
PROT SERPL-MCNC: 7.4 G/DL
RBC # BLD: 4.64 M/UL
RBC # FLD: 13.7 %
SODIUM SERPL-SCNC: 141 MMOL/L
WBC # FLD AUTO: 10.66 K/UL

## 2021-12-03 ENCOUNTER — APPOINTMENT (OUTPATIENT)
Dept: NEPHROLOGY | Facility: CLINIC | Age: 46
End: 2021-12-03
Payer: COMMERCIAL

## 2021-12-03 PROCEDURE — 99442: CPT

## 2021-12-03 RX ORDER — PREDNISONE 20 MG/1
20 TABLET ORAL DAILY
Qty: 5 | Refills: 0 | Status: DISCONTINUED | COMMUNITY
Start: 2021-10-18 | End: 2021-12-03

## 2021-12-03 RX ORDER — NALTREXONE HYDROCHLORIDE AND BUPROPION HYDROCHLORIDE 8; 90 MG/1; MG/1
8-90 TABLET, EXTENDED RELEASE ORAL
Qty: 70 | Refills: 0 | Status: DISCONTINUED | COMMUNITY
Start: 2021-08-02 | End: 2021-12-03

## 2021-12-08 ENCOUNTER — APPOINTMENT (OUTPATIENT)
Dept: PULMONOLOGY | Facility: CLINIC | Age: 46
End: 2021-12-08
Payer: COMMERCIAL

## 2021-12-08 VITALS
OXYGEN SATURATION: 95 % | DIASTOLIC BLOOD PRESSURE: 88 MMHG | SYSTOLIC BLOOD PRESSURE: 134 MMHG | HEART RATE: 68 BPM | TEMPERATURE: 97.6 F

## 2021-12-08 DIAGNOSIS — J45.909 UNSPECIFIED ASTHMA, UNCOMPLICATED: ICD-10-CM

## 2021-12-08 DIAGNOSIS — J18.9 PNEUMONIA, UNSPECIFIED ORGANISM: ICD-10-CM

## 2021-12-08 DIAGNOSIS — E55.9 VITAMIN D DEFICIENCY, UNSPECIFIED: ICD-10-CM

## 2021-12-08 PROCEDURE — 99407 BEHAV CHNG SMOKING > 10 MIN: CPT

## 2021-12-08 PROCEDURE — 71046 X-RAY EXAM CHEST 2 VIEWS: CPT

## 2021-12-08 PROCEDURE — 99214 OFFICE O/P EST MOD 30 MIN: CPT | Mod: 25

## 2021-12-09 NOTE — ASSESSMENT
[FreeTextEntry1] : Continue Asmanex HFA\par 15 minutes spent in smoking cessation counseling. Educated patient on deleterious effects and all potential consequences of smoking. Counseled patient on various smoking cessation techniques.\par

## 2022-01-12 ENCOUNTER — APPOINTMENT (OUTPATIENT)
Dept: PULMONOLOGY | Facility: CLINIC | Age: 47
End: 2022-01-12

## 2022-02-02 ENCOUNTER — APPOINTMENT (OUTPATIENT)
Dept: CARDIOLOGY | Facility: CLINIC | Age: 47
End: 2022-02-02
Payer: COMMERCIAL

## 2022-02-02 VITALS
DIASTOLIC BLOOD PRESSURE: 80 MMHG | OXYGEN SATURATION: 98 % | HEIGHT: 71 IN | TEMPERATURE: 97.6 F | BODY MASS INDEX: 40.6 KG/M2 | HEART RATE: 67 BPM | WEIGHT: 290 LBS | SYSTOLIC BLOOD PRESSURE: 138 MMHG

## 2022-02-02 DIAGNOSIS — R06.00 DYSPNEA, UNSPECIFIED: ICD-10-CM

## 2022-02-02 PROCEDURE — 99214 OFFICE O/P EST MOD 30 MIN: CPT

## 2022-02-02 PROCEDURE — 93000 ELECTROCARDIOGRAM COMPLETE: CPT

## 2022-02-02 RX ORDER — AZITHROMYCIN 250 MG/1
250 TABLET, FILM COATED ORAL
Qty: 1 | Refills: 0 | Status: DISCONTINUED | COMMUNITY
Start: 2021-10-18 | End: 2022-02-02

## 2022-02-02 RX ORDER — POTASSIUM CHLORIDE 1500 MG/1
20 TABLET, EXTENDED RELEASE ORAL DAILY
Qty: 60 | Refills: 2 | Status: DISCONTINUED | COMMUNITY
Start: 2019-04-08 | End: 2022-02-02

## 2022-02-02 RX ORDER — AZITHROMYCIN 250 MG/1
250 TABLET, FILM COATED ORAL
Qty: 1 | Refills: 0 | Status: DISCONTINUED | COMMUNITY
Start: 2021-12-01 | End: 2022-02-02

## 2022-02-02 NOTE — HISTORY OF PRESENT ILLNESS
[FreeTextEntry1] : The patient here for evaluation of high blood pressure. Patient is currently tolerating the current antihypertensive regime and they deny headaches, stiff neck, visual changes, or PND. The patient has been trying to stay on a low-sodium diet. Pt states at home he gets BP values around 180/105. Pt was prescribed spironolactone by Dr. Sanon and started it yesterday but has not been taking it consistently.\par Patient also presents today for follow-up of obesity. The patient states they have not lost significant weight since the last visit. Patient is currently obese and remains sedentary. The patient has not been compliant with medical follow-up instructions for weight-loss and exercise since the last visit.\par The patient presents today with complaints of occasional episodes of dyspnea that occur with exertion there's no regular  discernible pattern the patient does not feel it is associated with anxiety patient denies substernal chest pain, PND, with orthopnea and palpitations\par PMHx of Diverticulitis s/p colostomy s/p reversal 8/2020\par

## 2022-02-04 LAB
ALBUMIN SERPL ELPH-MCNC: 4.8 G/DL
ALP BLD-CCNC: 64 U/L
ALT SERPL-CCNC: 21 U/L
ANION GAP SERPL CALC-SCNC: 15 MMOL/L
APO B SERPL-MCNC: 103 MG/DL
AST SERPL-CCNC: 14 U/L
BASOPHILS # BLD AUTO: 0.07 K/UL
BASOPHILS NFR BLD AUTO: 0.7 %
BILIRUB DIRECT SERPL-MCNC: 0.1 MG/DL
BILIRUB INDIRECT SERPL-MCNC: 0.3 MG/DL
BILIRUB SERPL-MCNC: 0.4 MG/DL
BUN SERPL-MCNC: 18 MG/DL
CALCIUM SERPL-MCNC: 10.2 MG/DL
CHLORIDE SERPL-SCNC: 102 MMOL/L
CHOLEST SERPL-MCNC: 184 MG/DL
CK SERPL-CCNC: 134 U/L
CO2 SERPL-SCNC: 24 MMOL/L
COVID-19 SPIKE DOMAIN ANTIBODY INTERPRETATION: POSITIVE
CREAT SERPL-MCNC: 1.26 MG/DL
EOSINOPHIL # BLD AUTO: 0.27 K/UL
EOSINOPHIL NFR BLD AUTO: 2.6 %
ESTIMATED AVERAGE GLUCOSE: 100 MG/DL
GLUCOSE SERPL-MCNC: 85 MG/DL
HBA1C MFR BLD HPLC: 5.1 %
HCT VFR BLD CALC: 42 %
HDLC SERPL-MCNC: 39 MG/DL
HGB BLD-MCNC: 14.1 G/DL
IMM GRANULOCYTES NFR BLD AUTO: 0.7 %
LDLC SERPL CALC-MCNC: 106 MG/DL
LDLC SERPL DIRECT ASSAY-MCNC: 112 MG/DL
LYMPHOCYTES # BLD AUTO: 2.43 K/UL
LYMPHOCYTES NFR BLD AUTO: 23.5 %
MAN DIFF?: NORMAL
MCHC RBC-ENTMCNC: 30.4 PG
MCHC RBC-ENTMCNC: 33.6 GM/DL
MCV RBC AUTO: 90.5 FL
MONOCYTES # BLD AUTO: 0.84 K/UL
MONOCYTES NFR BLD AUTO: 8.1 %
NEUTROPHILS # BLD AUTO: 6.65 K/UL
NEUTROPHILS NFR BLD AUTO: 64.4 %
NONHDLC SERPL-MCNC: 145 MG/DL
PLATELET # BLD AUTO: 302 K/UL
POTASSIUM SERPL-SCNC: 3.8 MMOL/L
PROT SERPL-MCNC: 7.9 G/DL
RBC # BLD: 4.64 M/UL
RBC # FLD: 14 %
SARS-COV-2 AB SERPL IA-ACNC: >250 U/ML
SODIUM SERPL-SCNC: 141 MMOL/L
TRIGL SERPL-MCNC: 194 MG/DL
WBC # FLD AUTO: 10.33 K/UL

## 2022-02-11 ENCOUNTER — APPOINTMENT (OUTPATIENT)
Dept: SURGERY | Facility: CLINIC | Age: 47
End: 2022-02-11
Payer: COMMERCIAL

## 2022-02-11 ENCOUNTER — RESULT REVIEW (OUTPATIENT)
Age: 47
End: 2022-02-11

## 2022-02-11 VITALS
OXYGEN SATURATION: 97 % | TEMPERATURE: 97.5 F | WEIGHT: 295 LBS | SYSTOLIC BLOOD PRESSURE: 149 MMHG | BODY MASS INDEX: 41.3 KG/M2 | DIASTOLIC BLOOD PRESSURE: 99 MMHG | RESPIRATION RATE: 16 BRPM | HEART RATE: 73 BPM | HEIGHT: 71 IN

## 2022-02-11 VITALS — DIASTOLIC BLOOD PRESSURE: 108 MMHG | SYSTOLIC BLOOD PRESSURE: 158 MMHG

## 2022-02-11 PROCEDURE — 99213 OFFICE O/P EST LOW 20 MIN: CPT

## 2022-02-11 NOTE — PROCEDURE
[FreeTextEntry1] : 47-year-old male with longstanding history of morbid obesity (BMI 41) with comorbidities of hypertension, high cholesterol, who is interested in bariatric surgery.  He will undergo continuing medical weight management in preparation for planned laparoscopic sleeve gastrectomy after completing the required preoperative work-up.

## 2022-02-11 NOTE — HISTORY OF PRESENT ILLNESS
[de-identified] : ALICJA  is a 47 year  male  here for a reconsultation for weight loss surgery. \par The patient originally saw me 2 years ago for sleeve gastrectomy.  He was lost to follow-up.  In the intervening time, he underwent Ramsay's procedure for perforated diverticulitis that was performed at [facility].  He also had a reversal of his colostomy.  He has recovered well, however has gained weight.  He is committed to undergoing weight loss surgery to help improve his health.\par \par He has cut down significantly on his smoking, down from 1 pack a day to about 5 cigarettes/week.  He is committed to quitting.\par \par

## 2022-02-18 ENCOUNTER — APPOINTMENT (OUTPATIENT)
Dept: SURGERY | Facility: CLINIC | Age: 47
End: 2022-02-18
Payer: COMMERCIAL

## 2022-02-18 VITALS — HEIGHT: 71 IN | WEIGHT: 292 LBS | BODY MASS INDEX: 40.88 KG/M2

## 2022-02-18 PROCEDURE — 99212 OFFICE O/P EST SF 10 MIN: CPT | Mod: 95

## 2022-02-18 NOTE — ASSESSMENT
[FreeTextEntry1] : 44 year-old gentleman with BMI of 41 kg/m2 and longstanding  history of morbid obesity who presents for continuing medical weight management in preparation for planned laparoscopic sleeve gastrectomy.\adelso Woods working on cutting down smoking.

## 2022-02-18 NOTE — HISTORY OF PRESENT ILLNESS
[de-identified] : ALICJA is a 47 year old male presenting for a monthly weight check prior to laparoscopic sleeve gastrectomy.\par No changes in medical history reported.  Continuing to moderate portion sizes and make more healthful choices.  Increasing activity levels as much as possible. \par Has started some protein shakes as meal replacements.

## 2022-03-04 ENCOUNTER — APPOINTMENT (OUTPATIENT)
Dept: SURGERY | Facility: CLINIC | Age: 47
End: 2022-03-04

## 2022-03-08 ENCOUNTER — APPOINTMENT (OUTPATIENT)
Dept: CARDIOLOGY | Facility: CLINIC | Age: 47
End: 2022-03-08
Payer: COMMERCIAL

## 2022-03-08 PROCEDURE — 93015 CV STRESS TEST SUPVJ I&R: CPT

## 2022-03-09 LAB
25(OH)D3 SERPL-MCNC: 38.5 NG/ML
ALBUMIN SERPL ELPH-MCNC: 4.4 G/DL
ALP BLD-CCNC: 54 U/L
ALT SERPL-CCNC: 23 U/L
ANION GAP SERPL CALC-SCNC: 17 MMOL/L
APTT BLD: 34.2 SEC
AST SERPL-CCNC: 17 U/L
BASOPHILS # BLD AUTO: 0.07 K/UL
BASOPHILS NFR BLD AUTO: 0.7 %
BILIRUB SERPL-MCNC: 0.3 MG/DL
BUN SERPL-MCNC: 18 MG/DL
CALCIUM SERPL-MCNC: 9.2 MG/DL
CHLORIDE SERPL-SCNC: 103 MMOL/L
CO2 SERPL-SCNC: 21 MMOL/L
CREAT SERPL-MCNC: 1.36 MG/DL
EGFR: 65 ML/MIN/1.73M2
EOSINOPHIL # BLD AUTO: 0.28 K/UL
EOSINOPHIL NFR BLD AUTO: 2.8 %
FOLATE SERPL-MCNC: 18.2 NG/ML
GLUCOSE SERPL-MCNC: 85 MG/DL
HCT VFR BLD CALC: 38.8 %
HGB BLD-MCNC: 13 G/DL
IMM GRANULOCYTES NFR BLD AUTO: 0.4 %
INR PPP: 0.98 RATIO
IRON SERPL-MCNC: 79 UG/DL
LYMPHOCYTES # BLD AUTO: 1.71 K/UL
LYMPHOCYTES NFR BLD AUTO: 17.4 %
MAN DIFF?: NORMAL
MCHC RBC-ENTMCNC: 30.5 PG
MCHC RBC-ENTMCNC: 33.5 GM/DL
MCV RBC AUTO: 91.1 FL
MONOCYTES # BLD AUTO: 0.7 K/UL
MONOCYTES NFR BLD AUTO: 7.1 %
NEUTROPHILS # BLD AUTO: 7.03 K/UL
NEUTROPHILS NFR BLD AUTO: 71.6 %
PLATELET # BLD AUTO: 299 K/UL
POTASSIUM SERPL-SCNC: 4 MMOL/L
PROT SERPL-MCNC: 7.3 G/DL
PSA SERPL-MCNC: 0.2 NG/ML
PT BLD: 11.4 SEC
RBC # BLD: 4.26 M/UL
RBC # FLD: 13.8 %
SODIUM SERPL-SCNC: 141 MMOL/L
TSH SERPL-ACNC: 1.64 UIU/ML
VIT B12 SERPL-MCNC: 484 PG/ML
WBC # FLD AUTO: 9.83 K/UL

## 2022-03-11 ENCOUNTER — APPOINTMENT (OUTPATIENT)
Dept: NEPHROLOGY | Facility: CLINIC | Age: 47
End: 2022-03-11

## 2022-03-11 ENCOUNTER — APPOINTMENT (OUTPATIENT)
Dept: SURGERY | Facility: CLINIC | Age: 47
End: 2022-03-11
Payer: COMMERCIAL

## 2022-03-11 VITALS — HEIGHT: 71 IN | WEIGHT: 293 LBS | BODY MASS INDEX: 41.02 KG/M2

## 2022-03-11 PROCEDURE — 99212 OFFICE O/P EST SF 10 MIN: CPT | Mod: 95

## 2022-03-11 NOTE — HISTORY OF PRESENT ILLNESS
[de-identified] : ALICJA is a 47 year old male presenting for a monthly weight check prior to laparoscopic sleeve gastrectomy.\par No changes in medical history reported.  Continuing to moderate portion sizes and make more healthful choices.  Increasing activity levels as much as possible. \par Did PFTs and bloodwork.\par Has to have stress test repeated, scheduled for next week.\par

## 2022-03-11 NOTE — ASSESSMENT
[FreeTextEntry1] : 47 year-old gentleman with BMI of 41 kg/m2 and longstanding  history of morbid obesity who presents for continuing medical weight management in preparation for planned laparoscopic sleeve gastrectomy.\adelso Woods working on cutting down smoking.

## 2022-03-15 ENCOUNTER — APPOINTMENT (OUTPATIENT)
Dept: CARDIOLOGY | Facility: CLINIC | Age: 47
End: 2022-03-15

## 2022-03-16 ENCOUNTER — APPOINTMENT (OUTPATIENT)
Dept: ULTRASOUND IMAGING | Facility: HOSPITAL | Age: 47
End: 2022-03-16
Payer: COMMERCIAL

## 2022-03-16 ENCOUNTER — APPOINTMENT (OUTPATIENT)
Dept: RADIOLOGY | Facility: HOSPITAL | Age: 47
End: 2022-03-16
Payer: COMMERCIAL

## 2022-03-16 ENCOUNTER — OUTPATIENT (OUTPATIENT)
Dept: OUTPATIENT SERVICES | Facility: HOSPITAL | Age: 47
LOS: 1 days | End: 2022-03-16
Payer: COMMERCIAL

## 2022-03-16 DIAGNOSIS — Z00.00 ENCOUNTER FOR GENERAL ADULT MEDICAL EXAMINATION WITHOUT ABNORMAL FINDINGS: ICD-10-CM

## 2022-03-16 DIAGNOSIS — S43.006A UNSPECIFIED DISLOCATION OF UNSPECIFIED SHOULDER JOINT, INITIAL ENCOUNTER: Chronic | ICD-10-CM

## 2022-03-16 DIAGNOSIS — T14.8XXA OTHER INJURY OF UNSPECIFIED BODY REGION, INITIAL ENCOUNTER: Chronic | ICD-10-CM

## 2022-03-16 LAB — VIT B1 SERPL-MCNC: 196.1 NMOL/L

## 2022-03-16 PROCEDURE — 74246 X-RAY XM UPR GI TRC 2CNTRST: CPT | Mod: 26

## 2022-03-16 PROCEDURE — 74246 X-RAY XM UPR GI TRC 2CNTRST: CPT

## 2022-03-16 PROCEDURE — 76700 US EXAM ABDOM COMPLETE: CPT | Mod: 26

## 2022-03-16 PROCEDURE — 76700 US EXAM ABDOM COMPLETE: CPT

## 2022-03-18 ENCOUNTER — APPOINTMENT (OUTPATIENT)
Dept: SURGERY | Facility: CLINIC | Age: 47
End: 2022-03-18
Payer: COMMERCIAL

## 2022-03-18 VITALS — HEIGHT: 71 IN | WEIGHT: 293 LBS | BODY MASS INDEX: 41.02 KG/M2

## 2022-03-18 PROCEDURE — 99213 OFFICE O/P EST LOW 20 MIN: CPT | Mod: 95

## 2022-03-18 NOTE — PLAN
[FreeTextEntry1] : Encouraged to choose foods that decrease cravings and increase fullness. Increase lean protein intake, including plant proteins.\par The importance of hydration was emphasized. \par The importance of eating slowly was emphasized.\par The patient is committed to learning and incorporating diet and exercise modifications to optimize success after bariatric surgery.\par \par F/u 1 month \par \par

## 2022-03-18 NOTE — HISTORY OF PRESENT ILLNESS
[de-identified] : ALICJA is a 47 year old male presenting for a monthly weight check prior to laparoscopic sleeve gastrectomy.\par No changes in medical history reported.  Continuing to moderate portion sizes and make more healthful choices.  Increasing activity levels as much as possible. \par Stress test scheduled for 3/29\par Had UGIS and ultrasound this week.\par Has to schedule endoscopy.\par

## 2022-03-23 NOTE — REASON FOR VISIT
[Home] : at home, [unfilled] , at the time of the visit. [Medical Office: ([facility])___] : at the medical office located in  [Verbal consent obtained from patient] : the patient, [unfilled]

## 2022-03-25 ENCOUNTER — APPOINTMENT (OUTPATIENT)
Dept: SURGERY | Facility: CLINIC | Age: 47
End: 2022-03-25
Payer: COMMERCIAL

## 2022-03-25 VITALS — WEIGHT: 290 LBS | BODY MASS INDEX: 40.6 KG/M2 | HEIGHT: 71 IN

## 2022-03-25 PROCEDURE — 99212 OFFICE O/P EST SF 10 MIN: CPT | Mod: 95

## 2022-03-25 NOTE — PLAN
[FreeTextEntry1] : Encouraged to eat more whole foods, read food labels and reduce intake of processed foods.\par Advised to eat more slowly, chew more thoroughly, put fork down in between bites. \par \par \par to f/u 1 week

## 2022-03-25 NOTE — HISTORY OF PRESENT ILLNESS
[de-identified] : ALICJA is a 47 year old male presenting for a monthly weight check prior to laparoscopic sleeve gastrectomy.\par No changes in medical history reported.  Continuing to moderate portion sizes and make more healthful choices.  Increasing activity levels as much as possible. \par Has sleep study scheduled and stress test scheduled.\par \par

## 2022-03-29 ENCOUNTER — APPOINTMENT (OUTPATIENT)
Dept: CARDIOLOGY | Facility: CLINIC | Age: 47
End: 2022-03-29
Payer: COMMERCIAL

## 2022-03-29 PROCEDURE — 93351 STRESS TTE COMPLETE: CPT

## 2022-03-30 NOTE — REASON FOR VISIT
[Home] : at home, [unfilled] , at the time of the visit. [Medical Office: ([facility])___] : at the medical office located in  [Verbal consent obtained from patient] : the patient, [unfilled] [Follow-Up Visit] : a follow-up visit for [Morbid Obesity (BMI>40)] : morbid obesity (bmi>40)

## 2022-04-01 ENCOUNTER — APPOINTMENT (OUTPATIENT)
Dept: SURGERY | Facility: CLINIC | Age: 47
End: 2022-04-01
Payer: COMMERCIAL

## 2022-04-01 VITALS — HEIGHT: 71 IN | WEIGHT: 290 LBS | BODY MASS INDEX: 40.6 KG/M2

## 2022-04-01 PROCEDURE — 99212 OFFICE O/P EST SF 10 MIN: CPT | Mod: 95

## 2022-04-01 NOTE — ASSESSMENT
[FreeTextEntry1] : 47 year-old gentleman with BMI of 41 kg/m2 and longstanding  history of morbid obesity who presents for continuing medical weight management in preparation for planned laparoscopic sleeve gastrectomy.\par He is continuing to cut down on smoking, down to 5 cigarettes/week.

## 2022-04-01 NOTE — HISTORY OF PRESENT ILLNESS
[de-identified] : ALICJA is a 47 year old male presenting for a monthly weight check prior to laparoscopic sleeve gastrectomy.\par Had stress test and cleared by cardiologist.\par Colonoscopy pending.\par April 9 -- pulm appointment.\par \par No changes in medical history reported.  Continuing to moderate portion sizes and make more healthful choices.  Increasing activity levels as much as possible. \par Down to 5 cigarettes per week.

## 2022-04-01 NOTE — PLAN
[FreeTextEntry1] : Continue to avoid processed foods and sugary snacks.\par Focus on lean protein focused meals, with plenty of vegetables and also plant proteins.\par Encouraged increasing physical activity.\par \par Patient to follow-up in 1 week

## 2022-04-07 NOTE — REASON FOR VISIT
[Home] : at home, [unfilled] , at the time of the visit. [Medical Office: ([facility])___] : at the medical office located in  [Verbal consent obtained from patient] : the patient, [unfilled] [Follow-Up Visit] : a follow-up visit for [Morbid Obesity (BMI>40)] : morbid obesity (bmi>40)

## 2022-04-08 ENCOUNTER — APPOINTMENT (OUTPATIENT)
Dept: SURGERY | Facility: CLINIC | Age: 47
End: 2022-04-08
Payer: COMMERCIAL

## 2022-04-08 VITALS — BODY MASS INDEX: 40.88 KG/M2 | HEIGHT: 71 IN | WEIGHT: 292 LBS

## 2022-04-08 PROCEDURE — 99213 OFFICE O/P EST LOW 20 MIN: CPT | Mod: 95

## 2022-04-08 NOTE — PLAN
[FreeTextEntry1] : Continue moderating portion sizes, reduce sugar intake.\par Avoid highly processed foods.  Eat more vegetables.\par Walk as much as possible.\par Smoking cessation counseled.

## 2022-04-08 NOTE — ASSESSMENT
[FreeTextEntry1] : 47 year-old gentleman with BMI of 41 kg/m2 and longstanding  history of morbid obesity who presents for continuing medical weight management in preparation for planned laparoscopic sleeve gastrectomy.\par He is continuing to cut down on smoking.

## 2022-04-08 NOTE — HISTORY OF PRESENT ILLNESS
[de-identified] : ALICJA is a 47 year old male presenting for a monthly weight check prior to laparoscopic sleeve gastrectomy.\par No changes in medical history reported.  Continuing to moderate portion sizes and make more healthful choices.  Increasing activity levels as much as possible. \par Has sleep study next week. Has yet to schedule colonoscopy/endoscopy.\par Still working on cutting down smoking.

## 2022-04-20 NOTE — REASON FOR VISIT
[Follow-Up Visit] : a follow-up visit for [Morbid Obesity (BMI>40)] : morbid obesity (bmi>40) [Home] : at home, [unfilled] , at the time of the visit. [Medical Office: ([facility])___] : at the medical office located in  [Verbal consent obtained from patient] : the patient, [unfilled]

## 2022-04-22 ENCOUNTER — APPOINTMENT (OUTPATIENT)
Dept: SURGERY | Facility: CLINIC | Age: 47
End: 2022-04-22
Payer: COMMERCIAL

## 2022-04-22 VITALS — WEIGHT: 290 LBS | HEIGHT: 71 IN | BODY MASS INDEX: 40.6 KG/M2

## 2022-04-22 PROCEDURE — 99212 OFFICE O/P EST SF 10 MIN: CPT | Mod: 95

## 2022-04-22 NOTE — HISTORY OF PRESENT ILLNESS
[de-identified] : ALICJA is a 47 year old male presenting for a monthly weight check prior to laparoscopic sleeve gastrectomy.\par No changes in medical history reported.  Continuing to moderate portion sizes and make more healthful choices.  Increasing activity levels as much as possible.\par Awaiting sleep study results.\par Endoscopy appointment May 9.

## 2022-04-22 NOTE — ASSESSMENT
[FreeTextEntry1] : 47 year-old gentleman with BMI of 41 kg/m2 and longstanding  history of morbid obesity who presents for continuing medical weight management in preparation for planned laparoscopic sleeve gastrectomy.\par He has stopped smoking.

## 2022-04-22 NOTE — PLAN
[FreeTextEntry1] : Reminded that adequate and restful sleep are important to reduce cravings.\par Portion control will help reduce reflux symptoms.  \par Adding low-sugar protein shakes as a meal replacement once per day in preparation for bariatric diet.\par \par The patient expressed good understanding and is committed to learning and incorporating diet and exercise modifications to optimize success after bariatric surgery. \par \par

## 2022-04-29 ENCOUNTER — APPOINTMENT (OUTPATIENT)
Dept: SURGERY | Facility: CLINIC | Age: 47
End: 2022-04-29
Payer: COMMERCIAL

## 2022-04-29 VITALS — WEIGHT: 290 LBS | HEIGHT: 71 IN | BODY MASS INDEX: 40.6 KG/M2

## 2022-04-29 PROCEDURE — 99212 OFFICE O/P EST SF 10 MIN: CPT | Mod: 95

## 2022-04-29 NOTE — HISTORY OF PRESENT ILLNESS
[de-identified] : ALICJA is a 47 year old male presenting for a monthly weight check prior to laparoscopic sleeve gastrectomy.\par No changes in medical history reported.  Continuing to moderate portion sizes and make more healthful choices.  Increasing activity levels as much as possible. \par Has severe sleep apnea and will get fitted for CPAP.\par Has GI appointment next month.\par

## 2022-04-29 NOTE — ASSESSMENT
[FreeTextEntry1] : 47 year-old gentleman with BMI of 41 kg/m2 and severe MICHELE who presents for continuing medical weight management in preparation for planned laparoscopic sleeve gastrectomy.\par

## 2022-04-29 NOTE — PLAN
[FreeTextEntry1] : Encouraged to eat more whole foods, read food labels and reduce intake of processed foods.\par Advised to eat more slowly, chew more thoroughly, put fork down in between bites. \par Discussed importance of incorporating these healthy eating habits in order to optimize post-operative outcome.\par \par

## 2022-05-05 ENCOUNTER — APPOINTMENT (OUTPATIENT)
Dept: CARDIOLOGY | Facility: CLINIC | Age: 47
End: 2022-05-05

## 2022-05-05 NOTE — REASON FOR VISIT
[Home] : at home, [unfilled] , at the time of the visit. [Medical Office: ([facility])___] : at the medical office located in  [Verbal consent obtained from patient] : the patient, [unfilled] [Follow-Up Visit] : a follow-up visit for [Morbid Obesity (BMI>40)] : morbid obesity (bmi>40)

## 2022-05-06 ENCOUNTER — APPOINTMENT (OUTPATIENT)
Dept: SURGERY | Facility: CLINIC | Age: 47
End: 2022-05-06
Payer: COMMERCIAL

## 2022-05-06 PROCEDURE — 99212 OFFICE O/P EST SF 10 MIN: CPT | Mod: 95

## 2022-05-06 NOTE — HISTORY OF PRESENT ILLNESS
[de-identified] : ALICJA is a 47 year old male presenting for a monthly weight check prior to laparoscopic sleeve gastrectomy.\par No changes in medical history reported.  Continuing to moderate portion sizes and make more healthful choices.  Increasing activity levels as much as possible.

## 2022-05-13 ENCOUNTER — APPOINTMENT (OUTPATIENT)
Dept: SURGERY | Facility: CLINIC | Age: 47
End: 2022-05-13

## 2022-05-23 ENCOUNTER — APPOINTMENT (OUTPATIENT)
Dept: SURGERY | Facility: CLINIC | Age: 47
End: 2022-05-23
Payer: COMMERCIAL

## 2022-05-23 PROCEDURE — 99212 OFFICE O/P EST SF 10 MIN: CPT | Mod: 95

## 2022-05-23 NOTE — ASSESSMENT
[FreeTextEntry1] : 47 year-old male with BMI of 41 kg/m2 and severe MICHELE who presents for continuing medical weight management in preparation for planned laparoscopic sleeve gastrectomy.\par

## 2022-05-23 NOTE — PLAN
[FreeTextEntry1] : Continue diet focusing on fresh produce, lean proteins and include plant-based proteins\par Exercise regularly, aiming for 30 minutes cardiovascular exercise daily with some weight resistance training\par Eat slowly, chew thoroughly\par Avoid high-calorie drinks\par Schedule endoscopy

## 2022-05-23 NOTE — HISTORY OF PRESENT ILLNESS
[de-identified] : ALICJA is a 47 year old male presenting for a monthly weight check prior to laparoscopic sleeve gastrectomy.\par No changes in medical history reported.  Continuing to moderate portion sizes and make more healthful choices.  Increasing activity levels as much as possible. \adelso Has colonoscopy set up for next week with his colorectal surgeon.\adelso Has yet to schedule upper endoscopy with GI.\adelso

## 2022-06-07 ENCOUNTER — NON-APPOINTMENT (OUTPATIENT)
Age: 47
End: 2022-06-07

## 2022-06-07 ENCOUNTER — APPOINTMENT (OUTPATIENT)
Dept: CARDIOLOGY | Facility: CLINIC | Age: 47
End: 2022-06-07
Payer: COMMERCIAL

## 2022-06-07 VITALS — SYSTOLIC BLOOD PRESSURE: 128 MMHG | HEART RATE: 76 BPM | OXYGEN SATURATION: 98 % | DIASTOLIC BLOOD PRESSURE: 99 MMHG

## 2022-06-07 PROCEDURE — 93000 ELECTROCARDIOGRAM COMPLETE: CPT

## 2022-06-07 PROCEDURE — 99214 OFFICE O/P EST MOD 30 MIN: CPT

## 2022-06-07 NOTE — HISTORY OF PRESENT ILLNESS
[FreeTextEntry1] : I was asked to see this delightful patient today for Pre-op Evaluation  prior to colonoscopy with   Dr. Lawrence at fax number  [phone]  .  The patient denies fever, cough, wheezing, sputum production, hemoptysis, dyspnea, congestion, diarrhea, constipation, nausea, vomiting, bright red blood per rectum, melena, angina, chest pain, palpitations, diaphoresis, PND, incontinence, frequency, urgency, dysuria, edema, joint pain, headache, weakness, numbness and dizziness. The date of the planned procedure is: 6/13/2022.\par \par \par The patient presents for evaluation of high blood pressure. Patient is currently tolerating the current  antihypertensive regime and they deny headaches, stiff neck, visual changes, pedal Edema or PND. They also are here for follow-up of elevated cholesterol. Patient is currently tolerating medication and and does not complain of new  muscle pain, joint pain, back pain,urinary changes ,nausea, vomiting, abdominal pain or diarrhea. The patient is trying to follow a low cholesterol diet. \par \par pt w/ calcium score of 15

## 2022-06-17 ENCOUNTER — APPOINTMENT (OUTPATIENT)
Dept: GASTROENTEROLOGY | Facility: CLINIC | Age: 47
End: 2022-06-17
Payer: COMMERCIAL

## 2022-06-17 ENCOUNTER — APPOINTMENT (OUTPATIENT)
Dept: SURGERY | Facility: CLINIC | Age: 47
End: 2022-06-17

## 2022-06-17 VITALS
HEART RATE: 92 BPM | OXYGEN SATURATION: 96 % | BODY MASS INDEX: 41.16 KG/M2 | WEIGHT: 294 LBS | DIASTOLIC BLOOD PRESSURE: 80 MMHG | TEMPERATURE: 96 F | RESPIRATION RATE: 18 BRPM | SYSTOLIC BLOOD PRESSURE: 140 MMHG | HEIGHT: 71 IN

## 2022-06-17 DIAGNOSIS — Z01.818 ENCOUNTER FOR OTHER PREPROCEDURAL EXAMINATION: ICD-10-CM

## 2022-06-17 DIAGNOSIS — Z86.39 PERSONAL HISTORY OF OTHER ENDOCRINE, NUTRITIONAL AND METABOLIC DISEASE: ICD-10-CM

## 2022-06-17 DIAGNOSIS — Z86.79 PERSONAL HISTORY OF OTHER DISEASES OF THE CIRCULATORY SYSTEM: ICD-10-CM

## 2022-06-17 PROCEDURE — 99204 OFFICE O/P NEW MOD 45 MIN: CPT

## 2022-06-17 NOTE — PHYSICAL EXAM
[Auscultation Breath Sounds / Voice Sounds] : lungs were clear to auscultation bilaterally [Heart Rate And Rhythm] : heart rate was normal and rhythm regular [Heart Sounds] : normal S1 and S2 [Heart Sounds Gallop] : no gallops [Murmurs] : no murmurs [Heart Sounds Pericardial Friction Rub] : no pericardial rub [Bowel Sounds] : normal bowel sounds [Abdomen Soft] : soft [Abdomen Tenderness] : non-tender [] : no hepato-splenomegaly [Abdomen Mass (___ Cm)] : no abdominal mass palpated [FreeTextEntry1] : vertical surgical scar

## 2022-06-17 NOTE — CONSULT LETTER
[Dear  ___] : Dear  [unfilled], [Consult Letter:] : I had the pleasure of evaluating your patient, [unfilled]. [( Thank you for referring [unfilled] for consultation for _____ )] : Thank you for referring [unfilled] for consultation for [unfilled] [Please see my note below.] : Please see my note below. [Consult Closing:] : Thank you very much for allowing me to participate in the care of this patient.  If you have any questions, please do not hesitate to contact me. [Sincerely,] : Sincerely, [FreeTextEntry3] : Don\par \par Cullen Nolasco MD\par \par Gastroenterology\par [facility]\par [facility]\par \par

## 2022-06-17 NOTE — ASSESSMENT
[FreeTextEntry1] : ALICJA ROSALES was advised to undergo colonoscopy to which he agreed. The procedure will be performed in [facility] with the assistance of an anesthesiologist. The patient was given a Suprep preparation prescription and understood the \par procedure as it was explained to his. He was given a booklet distributed by the [employer] Society of Gastrointestinal\par  Endoscopy explaining the procedure in detail and he understood the risks of the procedure not limited to infection, bleeding,\par perforation or non- diagnosis of colorectal cancer. He was advised that he could not drive home, if he chooses to\par  receive sedation.\par \par Further diagnostic and treatment recommendations will be based upon the procedure and any biopsies, if they are taken.\par \par Thank you for allowing me to participate in this patients health care.\par \par , Best personal regards -- Don\par \par \par ALICJA ROSALES was advised to undergo endoscopy to which he agreed. The procedure will be performed in [facility] with the assistance of an anesthesiologist. He was given a booklet distributed by the [employer] Society of Gastrointestinal Endoscopy explaining the procedure in detail and he understood the risks of the procedure not limited to infection, bleeding, perforation or non- diagnosis of gastric or esophageal cancer.  He was advised that he could not drive home, if he chooses to receive sedation. Further diagnostic and treatment recommendations will be based upon the procedure and any biopsies, if they are taken. Thank you for allowing me to participate in this patients health care.\par

## 2022-06-17 NOTE — HISTORY OF PRESENT ILLNESS
[FreeTextEntry1] : He is a 47-year-old asymptomatic male referred for pre bariatric endoscopic assessment of his  stomach and his colon.  He denies abdominal pain or heartburn.  He has a history of diverticulitis 2 years ago requiring surgery with a colostomy, which was later closed 6 months later. He has never had a screening colonoscopy..  He denies a family history of colon or gastric cancer

## 2022-06-20 ENCOUNTER — APPOINTMENT (OUTPATIENT)
Dept: SURGERY | Facility: CLINIC | Age: 47
End: 2022-06-20
Payer: COMMERCIAL

## 2022-06-20 PROCEDURE — 99212 OFFICE O/P EST SF 10 MIN: CPT | Mod: 95

## 2022-06-20 NOTE — HISTORY OF PRESENT ILLNESS
[de-identified] : ALICJA is a 47 year old male presenting for a monthly weight check prior to laparoscopic sleeve gastrectomy.\par No changes in medical history reported.  Continuing to moderate portion sizes and make more healthful choices.  Increasing activity levels as much as possible. \par EGD scheduled for July 28.\par Getting CPAP machine end of this machine.\par

## 2022-07-27 ENCOUNTER — RESULT REVIEW (OUTPATIENT)
Age: 47
End: 2022-07-27

## 2022-07-27 ENCOUNTER — APPOINTMENT (OUTPATIENT)
Dept: GASTROENTEROLOGY | Facility: AMBULATORY SURGERY CENTER | Age: 47
End: 2022-07-27

## 2022-07-27 PROCEDURE — 43239 EGD BIOPSY SINGLE/MULTIPLE: CPT

## 2022-07-27 PROCEDURE — G0121 COLON CA SCRN NOT HI RSK IND: CPT

## 2022-09-07 ENCOUNTER — OUTPATIENT (OUTPATIENT)
Dept: OUTPATIENT SERVICES | Facility: HOSPITAL | Age: 47
LOS: 1 days | End: 2022-09-07
Payer: COMMERCIAL

## 2022-09-07 VITALS
DIASTOLIC BLOOD PRESSURE: 92 MMHG | OXYGEN SATURATION: 98 % | TEMPERATURE: 98 F | SYSTOLIC BLOOD PRESSURE: 151 MMHG | HEIGHT: 70 IN | WEIGHT: 298.95 LBS | HEART RATE: 68 BPM | RESPIRATION RATE: 18 BRPM

## 2022-09-07 DIAGNOSIS — Z87.81 PERSONAL HISTORY OF (HEALED) TRAUMATIC FRACTURE: Chronic | ICD-10-CM

## 2022-09-07 DIAGNOSIS — E66.01 MORBID (SEVERE) OBESITY DUE TO EXCESS CALORIES: ICD-10-CM

## 2022-09-07 DIAGNOSIS — Z01.818 ENCOUNTER FOR OTHER PREPROCEDURAL EXAMINATION: ICD-10-CM

## 2022-09-07 DIAGNOSIS — T14.8XXA OTHER INJURY OF UNSPECIFIED BODY REGION, INITIAL ENCOUNTER: Chronic | ICD-10-CM

## 2022-09-07 DIAGNOSIS — Z90.49 ACQUIRED ABSENCE OF OTHER SPECIFIED PARTS OF DIGESTIVE TRACT: Chronic | ICD-10-CM

## 2022-09-07 DIAGNOSIS — Z29.9 ENCOUNTER FOR PROPHYLACTIC MEASURES, UNSPECIFIED: ICD-10-CM

## 2022-09-07 DIAGNOSIS — I10 ESSENTIAL (PRIMARY) HYPERTENSION: ICD-10-CM

## 2022-09-07 DIAGNOSIS — S43.006A UNSPECIFIED DISLOCATION OF UNSPECIFIED SHOULDER JOINT, INITIAL ENCOUNTER: Chronic | ICD-10-CM

## 2022-09-07 LAB
ALBUMIN SERPL ELPH-MCNC: 4.4 G/DL — SIGNIFICANT CHANGE UP (ref 3.3–5)
ALP SERPL-CCNC: 66 U/L — SIGNIFICANT CHANGE UP (ref 40–120)
ALT FLD-CCNC: 22 U/L — SIGNIFICANT CHANGE UP (ref 10–45)
ANION GAP SERPL CALC-SCNC: 14 MMOL/L — SIGNIFICANT CHANGE UP (ref 5–17)
AST SERPL-CCNC: 17 U/L — SIGNIFICANT CHANGE UP (ref 10–40)
BILIRUB SERPL-MCNC: 0.6 MG/DL — SIGNIFICANT CHANGE UP (ref 0.2–1.2)
BLD GP AB SCN SERPL QL: NEGATIVE — SIGNIFICANT CHANGE UP
BUN SERPL-MCNC: 15 MG/DL — SIGNIFICANT CHANGE UP (ref 7–23)
CALCIUM SERPL-MCNC: 9.8 MG/DL — SIGNIFICANT CHANGE UP (ref 8.4–10.5)
CHLORIDE SERPL-SCNC: 101 MMOL/L — SIGNIFICANT CHANGE UP (ref 96–108)
CO2 SERPL-SCNC: 25 MMOL/L — SIGNIFICANT CHANGE UP (ref 22–31)
CREAT SERPL-MCNC: 1.05 MG/DL — SIGNIFICANT CHANGE UP (ref 0.5–1.3)
EGFR: 89 ML/MIN/1.73M2 — SIGNIFICANT CHANGE UP
GLUCOSE SERPL-MCNC: 89 MG/DL — SIGNIFICANT CHANGE UP (ref 70–99)
HCT VFR BLD CALC: 40.2 % — SIGNIFICANT CHANGE UP (ref 39–50)
HGB BLD-MCNC: 14.1 G/DL — SIGNIFICANT CHANGE UP (ref 13–17)
MCHC RBC-ENTMCNC: 30.7 PG — SIGNIFICANT CHANGE UP (ref 27–34)
MCHC RBC-ENTMCNC: 35.1 GM/DL — SIGNIFICANT CHANGE UP (ref 32–36)
MCV RBC AUTO: 87.4 FL — SIGNIFICANT CHANGE UP (ref 80–100)
NRBC # BLD: 0 /100 WBCS — SIGNIFICANT CHANGE UP (ref 0–0)
PLATELET # BLD AUTO: 305 K/UL — SIGNIFICANT CHANGE UP (ref 150–400)
POTASSIUM SERPL-MCNC: 3.1 MMOL/L — LOW (ref 3.5–5.3)
POTASSIUM SERPL-SCNC: 3.1 MMOL/L — LOW (ref 3.5–5.3)
PROT SERPL-MCNC: 7.4 G/DL — SIGNIFICANT CHANGE UP (ref 6–8.3)
RBC # BLD: 4.6 M/UL — SIGNIFICANT CHANGE UP (ref 4.2–5.8)
RBC # FLD: 13.6 % — SIGNIFICANT CHANGE UP (ref 10.3–14.5)
RH IG SCN BLD-IMP: POSITIVE — SIGNIFICANT CHANGE UP
SODIUM SERPL-SCNC: 140 MMOL/L — SIGNIFICANT CHANGE UP (ref 135–145)
WBC # BLD: 12.47 K/UL — HIGH (ref 3.8–10.5)
WBC # FLD AUTO: 12.47 K/UL — HIGH (ref 3.8–10.5)

## 2022-09-07 PROCEDURE — 86901 BLOOD TYPING SEROLOGIC RH(D): CPT

## 2022-09-07 PROCEDURE — G0463: CPT

## 2022-09-07 PROCEDURE — 36415 COLL VENOUS BLD VENIPUNCTURE: CPT

## 2022-09-07 PROCEDURE — 85027 COMPLETE CBC AUTOMATED: CPT

## 2022-09-07 PROCEDURE — 80053 COMPREHEN METABOLIC PANEL: CPT

## 2022-09-07 PROCEDURE — 86900 BLOOD TYPING SEROLOGIC ABO: CPT

## 2022-09-07 PROCEDURE — 86850 RBC ANTIBODY SCREEN: CPT

## 2022-09-07 PROCEDURE — 83036 HEMOGLOBIN GLYCOSYLATED A1C: CPT

## 2022-09-07 RX ORDER — METOPROLOL TARTRATE 50 MG
1 TABLET ORAL
Qty: 0 | Refills: 0 | DISCHARGE

## 2022-09-07 RX ORDER — CHLORTHALIDONE 50 MG
1 TABLET ORAL
Qty: 0 | Refills: 0 | DISCHARGE

## 2022-09-07 RX ORDER — MINOXIDIL 10 MG
2 TABLET ORAL
Qty: 0 | Refills: 0 | DISCHARGE

## 2022-09-07 RX ORDER — DILTIAZEM HCL 120 MG
1 CAPSULE, EXT RELEASE 24 HR ORAL
Qty: 0 | Refills: 0 | DISCHARGE

## 2022-09-07 NOTE — H&P PST ADULT - PROBLEM SELECTOR PLAN 1
laparoscopic sleeve gastrectomy possible hiatal hernia scheduled for 9/21/2022.  presurgical instructions provided including antibacterial wash  Labs collected

## 2022-09-07 NOTE — H&P PST ADULT - HISTORY OF PRESENT ILLNESS
47M with a pmhx significant for morbid obesity, HTN, diverticulitis s/p colectomy 2020.  Pt in PST today in preparation for laparoscopic sleeve gastrectomy possible hiatal hernia scheduled for 9/21/2022.      Covid swab scheduled for 9/18/2022 @ [facility]

## 2022-09-07 NOTE — H&P PST ADULT - ASSESSMENT

## 2022-09-07 NOTE — H&P PST ADULT - NSICDXPASTMEDICALHX_GEN_ALL_CORE_FT
PAST MEDICAL HISTORY:  HLD (hyperlipidemia)     HTN (hypertension)     Obesity     MICHELE on CPAP recent diagnosis

## 2022-09-07 NOTE — H&P PST ADULT - NSICDXPASTSURGICALHX_GEN_ALL_CORE_FT
PAST SURGICAL HISTORY:  Compound fracture of left wrist, s/p repair with metal plate    Dislocated shoulder     H/O colectomy diverticulitis 8/2020    History of wrist fracture left - with plate

## 2022-09-08 LAB
A1C WITH ESTIMATED AVERAGE GLUCOSE RESULT: 5.2 % — SIGNIFICANT CHANGE UP (ref 4–5.6)
ESTIMATED AVERAGE GLUCOSE: 103 MG/DL — SIGNIFICANT CHANGE UP (ref 68–114)

## 2022-09-09 ENCOUNTER — APPOINTMENT (OUTPATIENT)
Dept: SURGERY | Facility: CLINIC | Age: 47
End: 2022-09-09

## 2022-09-09 VITALS
TEMPERATURE: 97.8 F | BODY MASS INDEX: 42.37 KG/M2 | RESPIRATION RATE: 18 BRPM | HEART RATE: 94 BPM | HEIGHT: 70 IN | DIASTOLIC BLOOD PRESSURE: 94 MMHG | SYSTOLIC BLOOD PRESSURE: 156 MMHG | WEIGHT: 296 LBS | OXYGEN SATURATION: 98 %

## 2022-09-09 PROCEDURE — 99212 OFFICE O/P EST SF 10 MIN: CPT

## 2022-09-14 ENCOUNTER — NON-APPOINTMENT (OUTPATIENT)
Age: 47
End: 2022-09-14

## 2022-09-14 ENCOUNTER — APPOINTMENT (OUTPATIENT)
Dept: CARDIOLOGY | Facility: CLINIC | Age: 47
End: 2022-09-14

## 2022-09-14 VITALS
WEIGHT: 293 LBS | BODY MASS INDEX: 41.95 KG/M2 | OXYGEN SATURATION: 96 % | DIASTOLIC BLOOD PRESSURE: 96 MMHG | SYSTOLIC BLOOD PRESSURE: 140 MMHG | HEIGHT: 70 IN | HEART RATE: 62 BPM

## 2022-09-14 DIAGNOSIS — Z92.29 PERSONAL HISTORY OF OTHER DRUG THERAPY: ICD-10-CM

## 2022-09-14 DIAGNOSIS — E87.6 HYPOKALEMIA: ICD-10-CM

## 2022-09-14 DIAGNOSIS — K57.32 DIVERTICULITIS OF LARGE INTESTINE W/OUT PERFORATION OR ABSCESS W/OUT BLEEDING: ICD-10-CM

## 2022-09-14 DIAGNOSIS — Z01.810 ENCOUNTER FOR PREPROCEDURAL CARDIOVASCULAR EXAMINATION: ICD-10-CM

## 2022-09-14 DIAGNOSIS — Z01.818 ENCOUNTER FOR OTHER PREPROCEDURAL EXAMINATION: ICD-10-CM

## 2022-09-14 PROBLEM — E66.9 OBESITY, UNSPECIFIED: Chronic | Status: ACTIVE | Noted: 2022-09-07

## 2022-09-14 PROBLEM — G47.33 OBSTRUCTIVE SLEEP APNEA (ADULT) (PEDIATRIC): Chronic | Status: ACTIVE | Noted: 2022-09-07

## 2022-09-14 PROCEDURE — 99214 OFFICE O/P EST MOD 30 MIN: CPT

## 2022-09-14 RX ORDER — SPIRONOLACTONE 25 MG/1
25 TABLET ORAL DAILY
Qty: 30 | Refills: 3 | Status: DISCONTINUED | COMMUNITY
Start: 2021-12-03 | End: 2022-09-14

## 2022-09-14 RX ORDER — NIFEDIPINE 30 MG
0 TABLET, EXTENDED RELEASE 24 HR ORAL
Qty: 0 | Refills: 0 | DISCHARGE

## 2022-09-14 RX ORDER — HYDRALAZINE HCL 50 MG
0 TABLET ORAL
Qty: 0 | Refills: 0 | DISCHARGE

## 2022-09-14 NOTE — PHARMACOTHERAPY INTERVENTION NOTE - COMMENTS
Sleeve gastrectomy scheduled for 09/21/2022.    Patient medication reconciliation completed. Confirmed with patient and [facility] pharmacy. Patient currently taking:   Home Medications:  hydrALAZINE 100 mg oral tablet: 1 tab(s) orally 3 times a day  labetalol 100 mg oral tablet: 1 tab(s) orally 2 times a day  NIFEdipine 60 mg oral tablet, extended release: 1 tab(s) orally once a day  Vitamin D3 2000 intl units oral tablet: 1 tab(s) orally once a day    Patient was instructed to use crushed, dissolvable, chewable, or liquid formulations of medications for 1 month after surgery. Patient was informed to take daily multivitamins post surgically and will be ordering chewable vitamins online. Patient reeducated on NSAID avoidance (ibuprofen, ASA, naproxen, aleve) as they increase risk of GI bleeding; may use APAP for mild pain otherwise contact prescriber for consult. Patient was informed on indications and directions for administration for acetaminophen liquid and omeprazole DR. Patient was instructed to take the medications as follows:     Crush hydralazine and labetalol  Change to chewable form for vitamin D  Nifedipine ER- in discussion with PCP Dr. Sigifredo Zarate, PharmD  PGY2 Critical Care Pharmacy Resident  Available on Microsoft Teams

## 2022-09-15 PROBLEM — Z01.818 PREOPERATIVE EXAMINATION: Status: ACTIVE | Noted: 2019-08-08

## 2022-09-15 NOTE — HISTORY OF PRESENT ILLNESS
[FreeTextEntry1] : I was asked to see this delightful patient today for Pre-op Evaluation  prior to gastric sleeve  with   Dr. Chow at fax number [phone] .  The patient denies fever, cough, wheezing, sputum production, hemoptysis, dyspnea, congestion, diarrhea, constipation, nausea, vomiting, bright red blood per rectum, melena, angina, chest pain, palpitations, diaphoresis, PND, incontinence, frequency, urgency, dysuria, edema, joint pain, headache, weakness, numbness and dizziness. The date of the planned procedure is: 9/21/2022\par \par Potassium 3.1 at PST 9/12\par  \par The patient presents for evaluation of high blood pressure. Patient is currently tolerating the current  antihypertensive regime and they deny headaches, stiff neck, visual changes, pedal Edema or PND. They also are here for follow-up of elevated cholesterol. Patient is currently tolerating medication and and does not complain of new  muscle pain, joint pain, back pain,urinary changes ,nausea, vomiting, abdominal pain or diarrhea. The patient is trying to follow a low cholesterol diet. \par \par Pt states he was walking outside yesterday and he started feeling some shortness of breath on exertion. Pt states that he held his Nifedipine this morning for that reason and now his blood pressure is elevated. Denies any chest pain, dizziness, palpitations, nausea/vomiting, fever. \par \par Patient also presents today for follow-up of obesity. The patient states they have not lost significant weight since the last visit. Patient is currently obese and remains sedentary. The patient has not been compliant with medical follow-up instructions for weight-loss and exercise since the last visit.\par  \par Pt w/ hx of diverticulitis s/p colostomy reversal August 2020

## 2022-09-16 LAB
ALBUMIN SERPL ELPH-MCNC: 4.8 G/DL
ALP BLD-CCNC: 58 U/L
ALT SERPL-CCNC: 29 U/L
ANION GAP SERPL CALC-SCNC: 18 MMOL/L
AST SERPL-CCNC: 29 U/L
BASOPHILS # BLD AUTO: 0.09 K/UL
BASOPHILS NFR BLD AUTO: 0.8 %
BILIRUB DIRECT SERPL-MCNC: 0.1 MG/DL
BILIRUB INDIRECT SERPL-MCNC: 0.3 MG/DL
BILIRUB SERPL-MCNC: 0.4 MG/DL
BUN SERPL-MCNC: 26 MG/DL
CALCIUM SERPL-MCNC: 10 MG/DL
CHLORIDE SERPL-SCNC: 98 MMOL/L
CHOLEST SERPL-MCNC: 171 MG/DL
CK SERPL-CCNC: 398 U/L
CO2 SERPL-SCNC: 24 MMOL/L
COVID-19 NUCLEOCAPSID  GAM ANTIBODY INTERPRETATION: NEGATIVE
COVID-19 SPIKE DOMAIN ANTIBODY INTERPRETATION: POSITIVE
CREAT SERPL-MCNC: 1.46 MG/DL
EGFR: 60 ML/MIN/1.73M2
EOSINOPHIL # BLD AUTO: 0.28 K/UL
EOSINOPHIL NFR BLD AUTO: 2.4 %
ESTIMATED AVERAGE GLUCOSE: 100 MG/DL
GLUCOSE SERPL-MCNC: 89 MG/DL
HBA1C MFR BLD HPLC: 5.1 %
HCT VFR BLD CALC: 41 %
HDLC SERPL-MCNC: 31 MG/DL
HGB BLD-MCNC: 14.2 G/DL
IMM GRANULOCYTES NFR BLD AUTO: 0.6 %
LDLC SERPL CALC-MCNC: 111 MG/DL
LDLC SERPL DIRECT ASSAY-MCNC: 103 MG/DL
LYMPHOCYTES # BLD AUTO: 2.01 K/UL
LYMPHOCYTES NFR BLD AUTO: 17.3 %
MAN DIFF?: NORMAL
MCHC RBC-ENTMCNC: 30.6 PG
MCHC RBC-ENTMCNC: 34.6 GM/DL
MCV RBC AUTO: 88.4 FL
MONOCYTES # BLD AUTO: 1.05 K/UL
MONOCYTES NFR BLD AUTO: 9.1 %
NEUTROPHILS # BLD AUTO: 8.1 K/UL
NEUTROPHILS NFR BLD AUTO: 69.8 %
NONHDLC SERPL-MCNC: 141 MG/DL
NT-PROBNP SERPL-MCNC: 36 PG/ML
PLATELET # BLD AUTO: 340 K/UL
POTASSIUM SERPL-SCNC: 3.6 MMOL/L
PROT SERPL-MCNC: 8 G/DL
RBC # BLD: 4.64 M/UL
RBC # FLD: 13.6 %
SARS-COV-2 AB SERPL IA-ACNC: >250 U/ML
SARS-COV-2 AB SERPL QL IA: 0.08 INDEX
SODIUM SERPL-SCNC: 140 MMOL/L
TRIGL SERPL-MCNC: 146 MG/DL
WBC # FLD AUTO: 11.6 K/UL

## 2022-09-18 ENCOUNTER — OUTPATIENT (OUTPATIENT)
Dept: OUTPATIENT SERVICES | Facility: HOSPITAL | Age: 47
LOS: 1 days | End: 2022-09-18
Payer: COMMERCIAL

## 2022-09-18 DIAGNOSIS — S43.006A UNSPECIFIED DISLOCATION OF UNSPECIFIED SHOULDER JOINT, INITIAL ENCOUNTER: Chronic | ICD-10-CM

## 2022-09-18 DIAGNOSIS — T14.8XXA OTHER INJURY OF UNSPECIFIED BODY REGION, INITIAL ENCOUNTER: Chronic | ICD-10-CM

## 2022-09-18 DIAGNOSIS — Z11.52 ENCOUNTER FOR SCREENING FOR COVID-19: ICD-10-CM

## 2022-09-18 DIAGNOSIS — Z87.81 PERSONAL HISTORY OF (HEALED) TRAUMATIC FRACTURE: Chronic | ICD-10-CM

## 2022-09-18 DIAGNOSIS — Z90.49 ACQUIRED ABSENCE OF OTHER SPECIFIED PARTS OF DIGESTIVE TRACT: Chronic | ICD-10-CM

## 2022-09-18 LAB — SARS-COV-2 RNA SPEC QL NAA+PROBE: SIGNIFICANT CHANGE UP

## 2022-09-18 PROCEDURE — U0005: CPT

## 2022-09-18 PROCEDURE — C9803: CPT

## 2022-09-18 PROCEDURE — U0003: CPT

## 2022-09-20 ENCOUNTER — TRANSCRIPTION ENCOUNTER (OUTPATIENT)
Age: 47
End: 2022-09-20

## 2022-09-21 ENCOUNTER — INPATIENT (INPATIENT)
Facility: HOSPITAL | Age: 47
LOS: 2 days | Discharge: HOME | DRG: 621 | End: 2022-09-24
Attending: SURGERY | Admitting: SURGERY
Payer: COMMERCIAL

## 2022-09-21 ENCOUNTER — APPOINTMENT (OUTPATIENT)
Dept: SURGERY | Facility: HOSPITAL | Age: 47
End: 2022-09-21
Payer: COMMERCIAL

## 2022-09-21 ENCOUNTER — RESULT REVIEW (OUTPATIENT)
Age: 47
End: 2022-09-21

## 2022-09-21 VITALS
DIASTOLIC BLOOD PRESSURE: 76 MMHG | OXYGEN SATURATION: 96 % | RESPIRATION RATE: 18 BRPM | HEART RATE: 62 BPM | SYSTOLIC BLOOD PRESSURE: 121 MMHG | WEIGHT: 283.73 LBS | TEMPERATURE: 98 F | HEIGHT: 70 IN

## 2022-09-21 DIAGNOSIS — T14.8XXA OTHER INJURY OF UNSPECIFIED BODY REGION, INITIAL ENCOUNTER: Chronic | ICD-10-CM

## 2022-09-21 DIAGNOSIS — Z90.49 ACQUIRED ABSENCE OF OTHER SPECIFIED PARTS OF DIGESTIVE TRACT: Chronic | ICD-10-CM

## 2022-09-21 DIAGNOSIS — S43.006A UNSPECIFIED DISLOCATION OF UNSPECIFIED SHOULDER JOINT, INITIAL ENCOUNTER: Chronic | ICD-10-CM

## 2022-09-21 DIAGNOSIS — E66.01 MORBID (SEVERE) OBESITY DUE TO EXCESS CALORIES: ICD-10-CM

## 2022-09-21 DIAGNOSIS — Z87.81 PERSONAL HISTORY OF (HEALED) TRAUMATIC FRACTURE: Chronic | ICD-10-CM

## 2022-09-21 LAB
ANION GAP SERPL CALC-SCNC: 15 MMOL/L — SIGNIFICANT CHANGE UP (ref 5–17)
BASE EXCESS BLDV CALC-SCNC: 1.2 MMOL/L — SIGNIFICANT CHANGE UP (ref -2–3)
BASOPHILS # BLD AUTO: 0.03 K/UL — SIGNIFICANT CHANGE UP (ref 0–0.2)
BASOPHILS NFR BLD AUTO: 0.2 % — SIGNIFICANT CHANGE UP (ref 0–2)
BUN SERPL-MCNC: 17 MG/DL — SIGNIFICANT CHANGE UP (ref 7–23)
CA-I SERPL-SCNC: 1.21 MMOL/L — SIGNIFICANT CHANGE UP (ref 1.15–1.33)
CALCIUM SERPL-MCNC: 8.8 MG/DL — SIGNIFICANT CHANGE UP (ref 8.4–10.5)
CHLORIDE BLDV-SCNC: 103 MMOL/L — SIGNIFICANT CHANGE UP (ref 96–108)
CHLORIDE SERPL-SCNC: 100 MMOL/L — SIGNIFICANT CHANGE UP (ref 96–108)
CO2 BLDV-SCNC: 28 MMOL/L — HIGH (ref 22–26)
CO2 SERPL-SCNC: 24 MMOL/L — SIGNIFICANT CHANGE UP (ref 22–31)
CREAT SERPL-MCNC: 1.26 MG/DL — SIGNIFICANT CHANGE UP (ref 0.5–1.3)
EGFR: 71 ML/MIN/1.73M2 — SIGNIFICANT CHANGE UP
EOSINOPHIL # BLD AUTO: 0.02 K/UL — SIGNIFICANT CHANGE UP (ref 0–0.5)
EOSINOPHIL NFR BLD AUTO: 0.1 % — SIGNIFICANT CHANGE UP (ref 0–6)
GAS PNL BLDV: 137 MMOL/L — SIGNIFICANT CHANGE UP (ref 136–145)
GAS PNL BLDV: SIGNIFICANT CHANGE UP
GAS PNL BLDV: SIGNIFICANT CHANGE UP
GLUCOSE BLDC GLUCOMTR-MCNC: 80 MG/DL — SIGNIFICANT CHANGE UP (ref 70–99)
GLUCOSE BLDV-MCNC: 83 MG/DL — SIGNIFICANT CHANGE UP (ref 70–99)
GLUCOSE SERPL-MCNC: 133 MG/DL — HIGH (ref 70–99)
HCO3 BLDV-SCNC: 27 MMOL/L — SIGNIFICANT CHANGE UP (ref 22–29)
HCT VFR BLD CALC: 42.5 % — SIGNIFICANT CHANGE UP (ref 39–50)
HCT VFR BLDA CALC: 44 % — SIGNIFICANT CHANGE UP (ref 39–51)
HGB BLD CALC-MCNC: 14.7 G/DL — SIGNIFICANT CHANGE UP (ref 12.6–17.4)
HGB BLD-MCNC: 14.6 G/DL — SIGNIFICANT CHANGE UP (ref 13–17)
IMM GRANULOCYTES NFR BLD AUTO: 0.8 % — SIGNIFICANT CHANGE UP (ref 0–0.9)
LACTATE BLDV-MCNC: 1.2 MMOL/L — SIGNIFICANT CHANGE UP (ref 0.5–2)
LYMPHOCYTES # BLD AUTO: 0.76 K/UL — LOW (ref 1–3.3)
LYMPHOCYTES # BLD AUTO: 4.5 % — LOW (ref 13–44)
MAGNESIUM SERPL-MCNC: 2.1 MG/DL — SIGNIFICANT CHANGE UP (ref 1.6–2.6)
MCHC RBC-ENTMCNC: 30.2 PG — SIGNIFICANT CHANGE UP (ref 27–34)
MCHC RBC-ENTMCNC: 34.4 GM/DL — SIGNIFICANT CHANGE UP (ref 32–36)
MCV RBC AUTO: 88 FL — SIGNIFICANT CHANGE UP (ref 80–100)
MONOCYTES # BLD AUTO: 0.2 K/UL — SIGNIFICANT CHANGE UP (ref 0–0.9)
MONOCYTES NFR BLD AUTO: 1.2 % — LOW (ref 2–14)
NEUTROPHILS # BLD AUTO: 15.65 K/UL — HIGH (ref 1.8–7.4)
NEUTROPHILS NFR BLD AUTO: 93.2 % — HIGH (ref 43–77)
NRBC # BLD: 0 /100 WBCS — SIGNIFICANT CHANGE UP (ref 0–0)
PCO2 BLDV: 44 MMHG — SIGNIFICANT CHANGE UP (ref 42–55)
PH BLDV: 7.39 — SIGNIFICANT CHANGE UP (ref 7.32–7.43)
PHOSPHATE SERPL-MCNC: 2.6 MG/DL — SIGNIFICANT CHANGE UP (ref 2.5–4.5)
PLATELET # BLD AUTO: 264 K/UL — SIGNIFICANT CHANGE UP (ref 150–400)
PO2 BLDV: 43 MMHG — SIGNIFICANT CHANGE UP (ref 25–45)
POTASSIUM BLDV-SCNC: 3.5 MMOL/L — SIGNIFICANT CHANGE UP (ref 3.5–5.1)
POTASSIUM SERPL-MCNC: 3.7 MMOL/L — SIGNIFICANT CHANGE UP (ref 3.5–5.3)
POTASSIUM SERPL-SCNC: 3.7 MMOL/L — SIGNIFICANT CHANGE UP (ref 3.5–5.3)
RBC # BLD: 4.83 M/UL — SIGNIFICANT CHANGE UP (ref 4.2–5.8)
RBC # FLD: 13.3 % — SIGNIFICANT CHANGE UP (ref 10.3–14.5)
RH IG SCN BLD-IMP: POSITIVE — SIGNIFICANT CHANGE UP
SAO2 % BLDV: 69.6 % — SIGNIFICANT CHANGE UP (ref 67–88)
SODIUM SERPL-SCNC: 139 MMOL/L — SIGNIFICANT CHANGE UP (ref 135–145)
WBC # BLD: 16.8 K/UL — HIGH (ref 3.8–10.5)
WBC # FLD AUTO: 16.8 K/UL — HIGH (ref 3.8–10.5)

## 2022-09-21 PROCEDURE — 49560: CPT

## 2022-09-21 PROCEDURE — 43775 LAP SLEEVE GASTRECTOMY: CPT

## 2022-09-21 PROCEDURE — 88307 TISSUE EXAM BY PATHOLOGIST: CPT | Mod: 26

## 2022-09-21 DEVICE — STAPLER COVIDIEN TRI-STAPLE 45MM PURPLE RELOAD: Type: IMPLANTABLE DEVICE | Status: FUNCTIONAL

## 2022-09-21 DEVICE — CLIP APPLIER COVIDIEN ENDOCLIP III 5MM: Type: IMPLANTABLE DEVICE | Status: FUNCTIONAL

## 2022-09-21 DEVICE — STAPLER COVIDIEN TRI-STAPLE 60MM PURPLE INTELLIGENT RELOAD: Type: IMPLANTABLE DEVICE | Status: FUNCTIONAL

## 2022-09-21 DEVICE — STAPLER COVIDIEN TRI-STAPLE 60MM BLACK INTELLIGENT RELOAD: Type: IMPLANTABLE DEVICE | Status: FUNCTIONAL

## 2022-09-21 RX ORDER — LABETALOL HCL 100 MG
10 TABLET ORAL ONCE
Refills: 0 | Status: COMPLETED | OUTPATIENT
Start: 2022-09-21 | End: 2022-09-21

## 2022-09-21 RX ORDER — ACETAMINOPHEN 500 MG
1000 TABLET ORAL ONCE
Refills: 0 | Status: COMPLETED | OUTPATIENT
Start: 2022-09-22 | End: 2022-09-22

## 2022-09-21 RX ORDER — HYOSCYAMINE SULFATE 0.13 MG
0.12 TABLET ORAL EVERY 6 HOURS
Refills: 0 | Status: DISCONTINUED | OUTPATIENT
Start: 2022-09-21 | End: 2022-09-24

## 2022-09-21 RX ORDER — PANTOPRAZOLE SODIUM 20 MG/1
40 TABLET, DELAYED RELEASE ORAL EVERY 24 HOURS
Refills: 0 | Status: DISCONTINUED | OUTPATIENT
Start: 2022-09-21 | End: 2022-09-24

## 2022-09-21 RX ORDER — CEFAZOLIN SODIUM 1 G
2000 VIAL (EA) INJECTION EVERY 8 HOURS
Refills: 0 | Status: DISCONTINUED | OUTPATIENT
Start: 2022-09-21 | End: 2022-09-21

## 2022-09-21 RX ORDER — LIDOCAINE HCL 20 MG/ML
0.2 VIAL (ML) INJECTION ONCE
Refills: 0 | Status: DISCONTINUED | OUTPATIENT
Start: 2022-09-21 | End: 2022-09-21

## 2022-09-21 RX ORDER — FOLIC ACID 0.8 MG
1 TABLET ORAL DAILY
Refills: 0 | Status: DISCONTINUED | OUTPATIENT
Start: 2022-09-21 | End: 2022-09-24

## 2022-09-21 RX ORDER — SODIUM CHLORIDE 9 MG/ML
1000 INJECTION, SOLUTION INTRAVENOUS
Refills: 0 | Status: DISCONTINUED | OUTPATIENT
Start: 2022-09-21 | End: 2022-09-24

## 2022-09-21 RX ORDER — KETOROLAC TROMETHAMINE 30 MG/ML
30 SYRINGE (ML) INJECTION EVERY 6 HOURS
Refills: 0 | Status: DISCONTINUED | OUTPATIENT
Start: 2022-09-21 | End: 2022-09-22

## 2022-09-21 RX ORDER — ACETAMINOPHEN 500 MG
1000 TABLET ORAL ONCE
Refills: 0 | Status: COMPLETED | OUTPATIENT
Start: 2022-09-21 | End: 2022-09-21

## 2022-09-21 RX ORDER — LABETALOL HCL 100 MG
100 TABLET ORAL
Refills: 0 | Status: DISCONTINUED | OUTPATIENT
Start: 2022-09-21 | End: 2022-09-24

## 2022-09-21 RX ORDER — HEPARIN SODIUM 5000 [USP'U]/ML
5000 INJECTION INTRAVENOUS; SUBCUTANEOUS ONCE
Refills: 0 | Status: COMPLETED | OUTPATIENT
Start: 2022-09-21 | End: 2022-09-21

## 2022-09-21 RX ORDER — CEFAZOLIN SODIUM 1 G
3000 VIAL (EA) INJECTION EVERY 8 HOURS
Refills: 0 | Status: COMPLETED | OUTPATIENT
Start: 2022-09-21 | End: 2022-09-22

## 2022-09-21 RX ORDER — ONDANSETRON 8 MG/1
4 TABLET, FILM COATED ORAL EVERY 6 HOURS
Refills: 0 | Status: DISCONTINUED | OUTPATIENT
Start: 2022-09-21 | End: 2022-09-24

## 2022-09-21 RX ORDER — CEFAZOLIN SODIUM 1 G
2000 VIAL (EA) INJECTION ONCE
Refills: 0 | Status: DISCONTINUED | OUTPATIENT
Start: 2022-09-21 | End: 2022-09-22

## 2022-09-21 RX ORDER — HYDROMORPHONE HYDROCHLORIDE 2 MG/ML
0.25 INJECTION INTRAMUSCULAR; INTRAVENOUS; SUBCUTANEOUS
Refills: 0 | Status: DISCONTINUED | OUTPATIENT
Start: 2022-09-21 | End: 2022-09-21

## 2022-09-21 RX ORDER — SODIUM CHLORIDE 9 MG/ML
3 INJECTION INTRAMUSCULAR; INTRAVENOUS; SUBCUTANEOUS EVERY 8 HOURS
Refills: 0 | Status: DISCONTINUED | OUTPATIENT
Start: 2022-09-21 | End: 2022-09-21

## 2022-09-21 RX ORDER — FENTANYL CITRATE 50 UG/ML
25 INJECTION INTRAVENOUS
Refills: 0 | Status: DISCONTINUED | OUTPATIENT
Start: 2022-09-21 | End: 2022-09-21

## 2022-09-21 RX ORDER — POTASSIUM PHOSPHATE, MONOBASIC POTASSIUM PHOSPHATE, DIBASIC 236; 224 MG/ML; MG/ML
30 INJECTION, SOLUTION INTRAVENOUS ONCE
Refills: 0 | Status: COMPLETED | OUTPATIENT
Start: 2022-09-21 | End: 2022-09-21

## 2022-09-21 RX ORDER — ONDANSETRON 8 MG/1
4 TABLET, FILM COATED ORAL ONCE
Refills: 0 | Status: DISCONTINUED | OUTPATIENT
Start: 2022-09-21 | End: 2022-09-21

## 2022-09-21 RX ORDER — INFLUENZA VIRUS VACCINE 15; 15; 15; 15 UG/.5ML; UG/.5ML; UG/.5ML; UG/.5ML
0.5 SUSPENSION INTRAMUSCULAR ONCE
Refills: 0 | Status: DISCONTINUED | OUTPATIENT
Start: 2022-09-21 | End: 2022-09-24

## 2022-09-21 RX ORDER — FENTANYL CITRATE 50 UG/ML
50 INJECTION INTRAVENOUS ONCE
Refills: 0 | Status: DISCONTINUED | OUTPATIENT
Start: 2022-09-21 | End: 2022-09-21

## 2022-09-21 RX ORDER — CHLORHEXIDINE GLUCONATE 213 G/1000ML
1 SOLUTION TOPICAL ONCE
Refills: 0 | Status: DISCONTINUED | OUTPATIENT
Start: 2022-09-21 | End: 2022-09-21

## 2022-09-21 RX ORDER — FOSAPREPITANT DIMEGLUMINE 150 MG/5ML
150 INJECTION, POWDER, LYOPHILIZED, FOR SOLUTION INTRAVENOUS ONCE
Refills: 0 | Status: COMPLETED | OUTPATIENT
Start: 2022-09-21 | End: 2022-09-21

## 2022-09-21 RX ORDER — HEPARIN SODIUM 5000 [USP'U]/ML
5000 INJECTION INTRAVENOUS; SUBCUTANEOUS EVERY 8 HOURS
Refills: 0 | Status: DISCONTINUED | OUTPATIENT
Start: 2022-09-21 | End: 2022-09-24

## 2022-09-21 RX ORDER — THIAMINE MONONITRATE (VIT B1) 100 MG
100 TABLET ORAL ONCE
Refills: 0 | Status: COMPLETED | OUTPATIENT
Start: 2022-09-21 | End: 2022-09-21

## 2022-09-21 RX ADMIN — SODIUM CHLORIDE 250 MILLILITER(S): 9 INJECTION, SOLUTION INTRAVENOUS at 15:00

## 2022-09-21 RX ADMIN — POTASSIUM PHOSPHATE, MONOBASIC POTASSIUM PHOSPHATE, DIBASIC 83.33 MILLIMOLE(S): 236; 224 INJECTION, SOLUTION INTRAVENOUS at 16:05

## 2022-09-21 RX ADMIN — ONDANSETRON 4 MILLIGRAM(S): 8 TABLET, FILM COATED ORAL at 18:37

## 2022-09-21 RX ADMIN — Medication 10 MILLIGRAM(S): at 16:55

## 2022-09-21 RX ADMIN — Medication 100 MILLIGRAM(S): at 15:00

## 2022-09-21 RX ADMIN — FOSAPREPITANT DIMEGLUMINE 300 MILLIGRAM(S): 150 INJECTION, POWDER, LYOPHILIZED, FOR SOLUTION INTRAVENOUS at 09:02

## 2022-09-21 RX ADMIN — Medication 200 MILLIGRAM(S): at 18:38

## 2022-09-21 RX ADMIN — Medication 10 MILLIGRAM(S): at 15:45

## 2022-09-21 RX ADMIN — Medication 30 MILLIGRAM(S): at 20:54

## 2022-09-21 RX ADMIN — HYDROMORPHONE HYDROCHLORIDE 0.25 MILLIGRAM(S): 2 INJECTION INTRAMUSCULAR; INTRAVENOUS; SUBCUTANEOUS at 14:00

## 2022-09-21 RX ADMIN — HYDROMORPHONE HYDROCHLORIDE 0.25 MILLIGRAM(S): 2 INJECTION INTRAMUSCULAR; INTRAVENOUS; SUBCUTANEOUS at 14:15

## 2022-09-21 RX ADMIN — Medication 1000 MILLIGRAM(S): at 17:55

## 2022-09-21 RX ADMIN — Medication 1 MILLIGRAM(S): at 16:50

## 2022-09-21 RX ADMIN — Medication 30 MILLIGRAM(S): at 20:24

## 2022-09-21 RX ADMIN — Medication 400 MILLIGRAM(S): at 17:28

## 2022-09-21 RX ADMIN — HEPARIN SODIUM 5000 UNIT(S): 5000 INJECTION INTRAVENOUS; SUBCUTANEOUS at 16:50

## 2022-09-21 RX ADMIN — HEPARIN SODIUM 5000 UNIT(S): 5000 INJECTION INTRAVENOUS; SUBCUTANEOUS at 09:00

## 2022-09-21 RX ADMIN — SODIUM CHLORIDE 150 MILLILITER(S): 9 INJECTION, SOLUTION INTRAVENOUS at 14:01

## 2022-09-21 RX ADMIN — Medication 0.12 MILLIGRAM(S): at 18:37

## 2022-09-21 RX ADMIN — Medication 100 MILLIGRAM(S): at 20:24

## 2022-09-21 RX ADMIN — PANTOPRAZOLE SODIUM 40 MILLIGRAM(S): 20 TABLET, DELAYED RELEASE ORAL at 15:00

## 2022-09-21 NOTE — BRIEF OPERATIVE NOTE - NSICDXBRIEFPROCEDURE_GEN_ALL_CORE_FT
PROCEDURES:  Laparoscopic sleeve gastrectomy 21-Sep-2022 13:17:57  Raimundo Hamilton  Laparoscopic lysis of intestinal adhesions 21-Sep-2022 13:18:30  Raimundo Hamilton  Repair of incisional hernia 21-Sep-2022 13:18:43  Raimundo Hamilton

## 2022-09-21 NOTE — PATIENT PROFILE ADULT - FALL HARM RISK - UNIVERSAL INTERVENTIONS
Bed in lowest position, wheels locked, appropriate side rails in place/Call bell, personal items and telephone in reach/Instruct patient to call for assistance before getting out of bed or chair/Non-slip footwear when patient is out of bed/[place] to call system/Physically safe environment - no spills, clutter or unnecessary equipment/Purposeful Proactive Rounding/Room/bathroom lighting operational, light cord in reach

## 2022-09-21 NOTE — CHART NOTE - NSCHARTNOTEFT_GEN_A_CORE
Green Team (General Surgery) Post Operative Check    SUBJECTIVE:  Pt seen and examined, and is resting comfortably in bed.  No acute events in PACU. VSS. Pt denies CP, SOB, headaches, changes in vision. Pt notes slight burping. Pt reports he has not passed gas or had a bowel movement. Pt's post operative recovery has been expectant.     OBJECTIVE:  Vital Signs Last 24 Hrs  T(C): 36.3 (22 Sep 2022 01:34), Max: 37.2 (21 Sep 2022 20:25)  T(F): 97.3 (22 Sep 2022 01:34), Max: 98.9 (21 Sep 2022 20:25)  HR: 90 (22 Sep 2022 01:34) (62 - 90)  BP: 161/95 (22 Sep 2022 01:34) (121/76 - 176/95)  BP(mean): 117 (21 Sep 2022 17:45) (92 - 129)  RR: 18 (22 Sep 2022 01:34) (18 - 20)  SpO2: 96% (22 Sep 2022 01:34) (91% - 100%)    Parameters below as of 22 Sep 2022 01:34  Patient On (Oxygen Delivery Method): room air        I&O's Detail    21 Sep 2022 07:01  -  22 Sep 2022 02:05  --------------------------------------------------------  IN:    IV PiggyBack: 166.6 mL    IV PiggyBack: 200 mL    Lactated Ringers: 450 mL    sodium chloride 0.9% w/ Additives: 750 mL  Total IN: 1566.6 mL    OUT:    Voided (mL): 4450 mL  Total OUT: 4450 mL    Total NET: -2883.4 mL        Exam:  PHYSICAL EXAM  GENERAL: NAD, lying in bed comfortably  CHEST: nonlabored, no increased WOB  ABDOMEN: Soft, Nontender, Nondistended. Incision sites clean, dry with no erythema or induration.  EXTREMITIES: Well perfused. No clubbing, cyanosis, or edema  NERVOUS SYSTEM:  Alert & Oriented X3                            14.6   16.80 )-----------( 264      ( 21 Sep 2022 14:13 )             42.5       09-21    139  |  100  |  17  ----------------------------<  133<H>  3.7   |  24  |  1.26    Ca    8.8      21 Sep 2022 14:13  Phos  2.6     09-21  Mg     2.1     09-21            ASSESSMENT:  47M s/p laparoscopic sleeve gastrectomy, laparoscopic lysis of intestinal adhesions. No acute events in PACU. VSS. Pt denies CP, SOB, headaches, changes in vision. Pt notes slight burping. Pt reports he has not passed gas or had a bowel movement. Pt's post operative recovery has been expectant. Pt meets critiera for floor level of care.     PLAN:  - [facility] Protocol        [facility]  [id number]

## 2022-09-21 NOTE — PRE-OP CHECKLIST - HEIGHT IN INCHES
CM met pt at bedside and made aware of CM role at dc  Pt's primary contact is  Lesli Alves  Pt reported that she lives with  and kids in a 2 story house with 1 KASSIE  Pt can have a 1st floor set up if needed  Pt was IPTA with all ADL's, drive and employed  Pt has no DME  Pt denies hx with HHC, STR, alc, drug and IP psych tx  Preferred pharmacy is [facility] in [place]  PCP is Dr Yumi Gil  Pt has transportation when dc  CM reviewed d/c planning process including the following: identifying help at home, patient preference for d/c planning needs, Discharge Lounge, [facility] Meds to Bed program, availability of treatment team to discuss questions or concerns patient and/or family may have regarding understanding medications and recognizing signs and symptoms once discharged  CM also encouraged patient to follow up with all recommended appointments after discharge  Patient advised of importance for patient and family to participate in managing patients medical well being 
10

## 2022-09-21 NOTE — BRIEF OPERATIVE NOTE - NSICDXBRIEFPOSTOP_GEN_ALL_CORE_FT
POST-OP DIAGNOSIS:  Morbid obesity 21-Sep-2022 13:19:20  Raimundo Hamilton  Ventral incisional hernia 21-Sep-2022 13:19:29  Raimundo Hamilton

## 2022-09-21 NOTE — PRE-ANESTHESIA EVALUATION ADULT - NSANTHPMHFT_GEN_ALL_CORE
47M with a pmhx significant for morbid obesity, HTN, diverticulitis s/p colectomy 2020.  Pt in PST today in preparation for laparoscopic sleeve gastrectomy possible hiatal hernia scheduled for 9/21/2022.

## 2022-09-21 NOTE — BRIEF OPERATIVE NOTE - OPERATION/FINDINGS
Right upper quadrant Veress needle access; no evidence of injury on entry. Significant lysis of adhesions. Sleeve gastrectomy leak test negative; hemostasis achieved. Please see operative report for further details.

## 2022-09-22 ENCOUNTER — TRANSCRIPTION ENCOUNTER (OUTPATIENT)
Age: 47
End: 2022-09-22

## 2022-09-22 LAB
ANION GAP SERPL CALC-SCNC: 18 MMOL/L — HIGH (ref 5–17)
BASOPHILS # BLD AUTO: 0.02 K/UL — SIGNIFICANT CHANGE UP (ref 0–0.2)
BASOPHILS NFR BLD AUTO: 0.1 % — SIGNIFICANT CHANGE UP (ref 0–2)
BUN SERPL-MCNC: 15 MG/DL — SIGNIFICANT CHANGE UP (ref 7–23)
CALCIUM SERPL-MCNC: 9 MG/DL — SIGNIFICANT CHANGE UP (ref 8.4–10.5)
CHLORIDE SERPL-SCNC: 100 MMOL/L — SIGNIFICANT CHANGE UP (ref 96–108)
CO2 SERPL-SCNC: 23 MMOL/L — SIGNIFICANT CHANGE UP (ref 22–31)
CREAT SERPL-MCNC: 1.33 MG/DL — HIGH (ref 0.5–1.3)
EGFR: 67 ML/MIN/1.73M2 — SIGNIFICANT CHANGE UP
EOSINOPHIL # BLD AUTO: 0 K/UL — SIGNIFICANT CHANGE UP (ref 0–0.5)
EOSINOPHIL NFR BLD AUTO: 0 % — SIGNIFICANT CHANGE UP (ref 0–6)
GLUCOSE SERPL-MCNC: 102 MG/DL — HIGH (ref 70–99)
HCT VFR BLD CALC: 41.6 % — SIGNIFICANT CHANGE UP (ref 39–50)
HGB BLD-MCNC: 14.2 G/DL — SIGNIFICANT CHANGE UP (ref 13–17)
IMM GRANULOCYTES NFR BLD AUTO: 0.8 % — SIGNIFICANT CHANGE UP (ref 0–0.9)
LYMPHOCYTES # BLD AUTO: 0.92 K/UL — LOW (ref 1–3.3)
LYMPHOCYTES # BLD AUTO: 5.7 % — LOW (ref 13–44)
MCHC RBC-ENTMCNC: 30.4 PG — SIGNIFICANT CHANGE UP (ref 27–34)
MCHC RBC-ENTMCNC: 34.1 GM/DL — SIGNIFICANT CHANGE UP (ref 32–36)
MCV RBC AUTO: 89.1 FL — SIGNIFICANT CHANGE UP (ref 80–100)
MONOCYTES # BLD AUTO: 0.99 K/UL — HIGH (ref 0–0.9)
MONOCYTES NFR BLD AUTO: 6.1 % — SIGNIFICANT CHANGE UP (ref 2–14)
NEUTROPHILS # BLD AUTO: 14.07 K/UL — HIGH (ref 1.8–7.4)
NEUTROPHILS NFR BLD AUTO: 87.3 % — HIGH (ref 43–77)
NRBC # BLD: 0 /100 WBCS — SIGNIFICANT CHANGE UP (ref 0–0)
PLATELET # BLD AUTO: 299 K/UL — SIGNIFICANT CHANGE UP (ref 150–400)
POTASSIUM SERPL-MCNC: 3.8 MMOL/L — SIGNIFICANT CHANGE UP (ref 3.5–5.3)
POTASSIUM SERPL-SCNC: 3.8 MMOL/L — SIGNIFICANT CHANGE UP (ref 3.5–5.3)
RBC # BLD: 4.67 M/UL — SIGNIFICANT CHANGE UP (ref 4.2–5.8)
RBC # FLD: 13.5 % — SIGNIFICANT CHANGE UP (ref 10.3–14.5)
SODIUM SERPL-SCNC: 141 MMOL/L — SIGNIFICANT CHANGE UP (ref 135–145)
WBC # BLD: 16.13 K/UL — HIGH (ref 3.8–10.5)
WBC # FLD AUTO: 16.13 K/UL — HIGH (ref 3.8–10.5)

## 2022-09-22 RX ORDER — ENOXAPARIN SODIUM 100 MG/ML
40 INJECTION SUBCUTANEOUS
Qty: 5.6 | Refills: 0
Start: 2022-09-22 | End: 2022-10-05

## 2022-09-22 RX ORDER — AMLODIPINE BESYLATE 2.5 MG/1
1 TABLET ORAL
Qty: 0 | Refills: 0 | DISCHARGE

## 2022-09-22 RX ORDER — LABETALOL HCL 100 MG
1 TABLET ORAL
Qty: 0 | Refills: 0 | DISCHARGE

## 2022-09-22 RX ORDER — OMEPRAZOLE 10 MG/1
1 CAPSULE, DELAYED RELEASE ORAL
Qty: 30 | Refills: 0
Start: 2022-09-22 | End: 2022-10-21

## 2022-09-22 RX ORDER — ACETAMINOPHEN 500 MG
15 TABLET ORAL
Qty: 300 | Refills: 0
Start: 2022-09-22 | End: 2022-09-26

## 2022-09-22 RX ORDER — ONDANSETRON 8 MG/1
1 TABLET, FILM COATED ORAL
Qty: 28 | Refills: 0
Start: 2022-09-22 | End: 2022-09-28

## 2022-09-22 RX ORDER — NIFEDIPINE 30 MG
1 TABLET, EXTENDED RELEASE 24 HR ORAL
Qty: 0 | Refills: 0 | DISCHARGE

## 2022-09-22 RX ORDER — HYOSCYAMINE SULFATE 0.13 MG
1 TABLET ORAL
Qty: 28 | Refills: 0
Start: 2022-09-22 | End: 2022-09-28

## 2022-09-22 RX ORDER — HYDRALAZINE HCL 50 MG
10 TABLET ORAL ONCE
Refills: 0 | Status: COMPLETED | OUTPATIENT
Start: 2022-09-22 | End: 2022-09-22

## 2022-09-22 RX ORDER — CHOLECALCIFEROL (VITAMIN D3) 125 MCG
1 CAPSULE ORAL
Qty: 0 | Refills: 0 | DISCHARGE

## 2022-09-22 RX ADMIN — HEPARIN SODIUM 5000 UNIT(S): 5000 INJECTION INTRAVENOUS; SUBCUTANEOUS at 16:34

## 2022-09-22 RX ADMIN — Medication 1 MILLIGRAM(S): at 14:12

## 2022-09-22 RX ADMIN — HEPARIN SODIUM 5000 UNIT(S): 5000 INJECTION INTRAVENOUS; SUBCUTANEOUS at 09:11

## 2022-09-22 RX ADMIN — Medication 0.12 MILLIGRAM(S): at 05:50

## 2022-09-22 RX ADMIN — Medication 400 MILLIGRAM(S): at 05:50

## 2022-09-22 RX ADMIN — Medication 0.12 MILLIGRAM(S): at 12:41

## 2022-09-22 RX ADMIN — Medication 30 MILLIGRAM(S): at 02:08

## 2022-09-22 RX ADMIN — ONDANSETRON 4 MILLIGRAM(S): 8 TABLET, FILM COATED ORAL at 05:50

## 2022-09-22 RX ADMIN — Medication 100 MILLIGRAM(S): at 05:50

## 2022-09-22 RX ADMIN — Medication 1000 MILLIGRAM(S): at 00:30

## 2022-09-22 RX ADMIN — ONDANSETRON 4 MILLIGRAM(S): 8 TABLET, FILM COATED ORAL at 00:00

## 2022-09-22 RX ADMIN — PANTOPRAZOLE SODIUM 40 MILLIGRAM(S): 20 TABLET, DELAYED RELEASE ORAL at 16:30

## 2022-09-22 RX ADMIN — Medication 30 MILLIGRAM(S): at 09:41

## 2022-09-22 RX ADMIN — SODIUM CHLORIDE 150 MILLILITER(S): 9 INJECTION, SOLUTION INTRAVENOUS at 09:17

## 2022-09-22 RX ADMIN — Medication 1000 MILLIGRAM(S): at 06:20

## 2022-09-22 RX ADMIN — ONDANSETRON 4 MILLIGRAM(S): 8 TABLET, FILM COATED ORAL at 17:02

## 2022-09-22 RX ADMIN — Medication 30 MILLIGRAM(S): at 02:38

## 2022-09-22 RX ADMIN — HEPARIN SODIUM 5000 UNIT(S): 5000 INJECTION INTRAVENOUS; SUBCUTANEOUS at 02:07

## 2022-09-22 RX ADMIN — Medication 0.12 MILLIGRAM(S): at 17:02

## 2022-09-22 RX ADMIN — Medication 100 MILLIGRAM(S): at 17:03

## 2022-09-22 RX ADMIN — Medication 400 MILLIGRAM(S): at 00:00

## 2022-09-22 RX ADMIN — Medication 30 MILLIGRAM(S): at 09:11

## 2022-09-22 RX ADMIN — Medication 30 MILLIGRAM(S): at 17:01

## 2022-09-22 RX ADMIN — Medication 0.12 MILLIGRAM(S): at 00:00

## 2022-09-22 RX ADMIN — Medication 200 MILLIGRAM(S): at 04:36

## 2022-09-22 RX ADMIN — ONDANSETRON 4 MILLIGRAM(S): 8 TABLET, FILM COATED ORAL at 23:02

## 2022-09-22 RX ADMIN — Medication 0.12 MILLIGRAM(S): at 23:03

## 2022-09-22 RX ADMIN — Medication 10 MILLIGRAM(S): at 22:13

## 2022-09-22 RX ADMIN — Medication 10 MILLIGRAM(S): at 23:39

## 2022-09-22 RX ADMIN — Medication 30 MILLIGRAM(S): at 17:31

## 2022-09-22 RX ADMIN — ONDANSETRON 4 MILLIGRAM(S): 8 TABLET, FILM COATED ORAL at 12:38

## 2022-09-22 NOTE — DIETITIAN INITIAL EVALUATION ADULT - PERSON TAUGHT/METHOD
1. Laparoscopic  sleeve gastrectomy recommendations reviewed/reinforced (discharge instruction handout references). Bariatric full liquid diet reviewed- sugar free, caffeine free, no carbonated beverages, low fat, no straws, no chewing gum, 6 small meals/day gradually increasing volume, and sipping beverages slowly, no water during meals- drink water 1 hour before or after meals, meeting hydration and protein needs. Discussed vitamin/mineral supplement compliance as discussed above. 2. RD to remain available to reinforce nutrition education as requested by patient/family/caregiver./verbal instruction/written material/teach back - (Patient repeats in own words)/patient instructed

## 2022-09-22 NOTE — PROGRESS NOTE ADULT - SUBJECTIVE AND OBJECTIVE BOX
POD #1 Lap gastric sleeve    24hr events:  -     Overnight events:   - No acute events    SUBJECTIVE:  Pt seen and examined, and is resting comfortably in bed. Pt denies CP, SOB, headaches, changes in vision.    OBJECTIVE:  Vital Signs Last 24 Hrs  T(C): 36.9 (22 Sep 2022 04:22), Max: 37.2 (21 Sep 2022 20:25)  T(F): 98.4 (22 Sep 2022 04:22), Max: 98.9 (21 Sep 2022 20:25)  HR: 95 (22 Sep 2022 04:22) (62 - 95)  BP: 169/93 (22 Sep 2022 04:22) (121/76 - 176/95)  BP(mean): 117 (21 Sep 2022 17:45) (92 - 129)  RR: 18 (22 Sep 2022 04:22) (18 - 20)  SpO2: 96% (22 Sep 2022 04:22) (91% - 100%)    Parameters below as of 22 Sep 2022 04:22  Patient On (Oxygen Delivery Method): room air          09-21-22 @ 07:01  -  09-22-22 @ 05:39  --------------------------------------------------------  IN: 1566.6 mL / OUT: 4850 mL / NET: -3283.4 mL        Physical Examination:  GEN: NAD, resting quietly, A&Ox3  PULM: symmetric chest rise bilaterally, no increased WOB  ABD: soft, nontender, nondistended, no rebound or guarding, incision sites  CDI  EXTR: no LE erythema, moving all extremities      LABS:                        14.6   16.80 )-----------( 264      ( 21 Sep 2022 14:13 )             42.5       09-21    139  |  100  |  17  ----------------------------<  133<H>  3.7   |  24  |  1.26    Ca    8.8      21 Sep 2022 14:13  Phos  2.6     09-21  Mg     2.1     09-21

## 2022-09-22 NOTE — DIETITIAN INITIAL EVALUATION ADULT - ORAL INTAKE PTA/DIET HISTORY
Pt reports following a full liquid diet for 2 weeks PTA as instructed by outpatient RD. Pt reports having Premier Protein shake, sugar-free pudding, soups. NKFA. Pt denies chewing/swallowing difficulty, nausea, vomiting, diarrhea, constipation.

## 2022-09-22 NOTE — DISCHARGE NOTE PROVIDER - CARE PROVIDER_API CALL
Van Chow  General Surgery  [address] [place] [address], [address]  [place], [place] [id number]  Phone: [phone]  Fax: [phone]  Follow Up Time: 2 weeks

## 2022-09-22 NOTE — DIETITIAN INITIAL EVALUATION ADULT - REASON FOR ADMISSION
Body mass index [BMI] 40.0-44.9, adult    Morbid or severe obesity due to excess calories    Chart reviewed, events noted. This is a "47M s/p laparoscopic sleeve gastrectomy, laparoscopic lysis of intestinal adhesions."

## 2022-09-22 NOTE — PROGRESS NOTE ADULT - SUBJECTIVE AND OBJECTIVE BOX
Post Op Day#: 1    Subjective: "Doign well, no N/V, good pain relief    Objective: No acute events overnight. Continuous pulse oximetry at bedside functioning,  Mildly elevated Blood pressure from baseline.  afebrile. Labs within acceptable range.  Ambulated independently around the unit.  Tolerating bariatric clear liquid diet .  Voiding as expected.                                               Vital Signs Last 24 Hrs  T(C): 37.1 (22 Sep 2022 09:28), Max: 37.2 (21 Sep 2022 20:25)  T(F): 98.7 (22 Sep 2022 09:28), Max: 98.9 (21 Sep 2022 20:25)  HR: 80 (22 Sep 2022 09:28) (69 - 95)  BP: 167/99 (22 Sep 2022 09:28) (122/75 - 176/95)  BP(mean): 117 (21 Sep 2022 17:45) (92 - 129)  RR: 18 (22 Sep 2022 09:28) (18 - 20)  SpO2: 94% (22 Sep 2022 09:28) (91% - 100%)    Parameters below as of 22 Sep 2022 09:28  Patient On (Oxygen Delivery Method): room air                                                   I&O's Summary    21 Sep 2022 07:01  -  22 Sep 2022 07:00  --------------------------------------------------------  IN: 3566.6 mL / OUT: 4850 mL / NET: -1283.4 mL                                                                          14.2   16.13 )-----------( 299      ( 22 Sep 2022 08:37 )             41.6                                                 09-22    141  |  100  |  15  ----------------------------<  102<H>  3.8   |  23  |  1.33<H>    Ca    9.0      22 Sep 2022 08:37  Phos  2.6     09-21  Mg     2.1     09-21      folic acid Injectable 1 milliGRAM(s) IV Push daily  heparin   Injectable 5000 Unit(s) SubCutaneous every 8 hours  hyoscyamine SL 0.125 milliGRAM(s) SubLingual every 6 hours  influenza   Vaccine 0.5 milliLiter(s) IntraMuscular once  ketorolac   Injectable 30 milliGRAM(s) IV Push every 6 hours  labetalol 100 milliGRAM(s) Oral two times a day  lactated ringers. 1000 milliLiter(s) IV Continuous <Continuous>  ondansetron Injectable 4 milliGRAM(s) IV Push every 6 hours  pantoprazole  Injectable 40 milliGRAM(s) IV Push every 24 hours  sodium chloride 0.9% 1000 milliLiter(s) IV Continuous <Continuous>      Physical Exam:         Lungs:  clear breath sounds b/l       Heart:  Regular rate & rhythm       Abdomen:  Soft, non-distended.  Scopes sites x 7 lean, dry and intact. + bs, - flatus, no rebound or guarding       Skin:  intact, pannus w/o rash       Extremities: + pulses, no edema, no calf tenderness, negative efrem's     VTE Extended Risk Assessment Scores             [facility]  VTE Predicted Low:   ( <1%)      Assessment and Plan:  Lap Lysis of Adhesion, Incisional Hernia Repair. Sleeve Gastrectomy    - Resume Preop Blood pressure medications  - Bariatric Clear diet today then protocol derived staged meal progression supervised by RD in outpatient setting  - DVT - LMWH (patient education with teach back). GI prophylaxis, Incentive spirometry  - Ambulate as tolerated  - Procedure specific education including postop complications, medications and side effects, diet, vitamins and VTE prevention. Written materials given  - Medication reviewed and reconciled, Bariatric meds obtained from [facility] prior to d/c  - D/C planning for tomorrow home once Bariatric 8-Point d/c criteria met  - Follow up with Dr Chow  in 7-10 days, Dietitian and PMD in 30 days.      Lizzy Zurita, NIDHI, ANP  [phone]

## 2022-09-22 NOTE — DISCHARGE NOTE PROVIDER - NSRESEARCHGRANT_OVERRIDEREC_GEN_A_CORE
This is a surgical and/or non-medical patient. Planned surgery/procedure/Other (please specify)/This is a surgical and/or non-medical patient.

## 2022-09-22 NOTE — DIETITIAN INITIAL EVALUATION ADULT - OTHER CALCULATIONS
estimated needs based in IBW with consideration for BMI > 40, desired weight loss, bariatric surgery

## 2022-09-22 NOTE — DIETITIAN INITIAL EVALUATION ADULT - PERTINENT LABORATORY DATA
09-22    141  |  100  |  15  ----------------------------<  102<H>  3.8   |  23  |  1.33<H>    Ca    9.0      22 Sep 2022 08:37  Phos  2.6     09-21  Mg     2.1     09-21    A1C with Estimated Average Glucose Result: 5.2 % (09-07-22 @ 17:07)

## 2022-09-22 NOTE — DIETITIAN INITIAL EVALUATION ADULT - OTHER INFO
Pt with multiple previous wt loss attempts per chart and was unable to lose and maintain significant wt loss. Pt reports weighing 301  pounds (X) prior to starting 2 week full liquid diet.  Pre-surgical wt (H&P) noted as 299 pounds (9/7/22). Current wt of  283 pounds (9/21).

## 2022-09-22 NOTE — DIETITIAN INITIAL EVALUATION ADULT - ENERGY INTAKE
Fair (50-75%) Pt now S/P laparoscopic vertical sleeve gastrectomy. Pt denies N+V, sipping on bariatric clear liquids during RD visit. Pt with knowledge of bariatric full liquid diet and receptive to in depth review/reinforcement. Pt reports home stock of protein shakes with plans to purchase more. Pt reports plan to purchase the necessary vitamins/minerals in chewable/liquid/crushable form including a multivitamin with added elemental iron, calcium citrate with vitamin D, and sublingual B12. Pt was advised to take the multivitamin with elemental iron at least 2 hours apart from the calcium citrate with vitamin D for optimal absorption. Pt plans to schedule a follow up appointment with outpatient RD. Pt able to teach back all points discussed during interview.

## 2022-09-22 NOTE — DISCHARGE NOTE PROVIDER - NSDCMRMEDTOKEN_GEN_ALL_CORE_FT
acetaminophen 500 mg/15 mL oral liquid: 15 milliliter(s) orally every 6 hours, As Needed   amLODIPine 5 mg oral tablet: 1 tab(s) orally once a day - crush  enoxaparin 40 mg/0.4 mL injectable solution: 40 milligram(s) subcutaneously once a day   hydrALAZINE 100 mg oral tablet: 1 tab(s) orally 3 times a day  Crush after surgery  hyoscyamine 0.125 mg sublingual tablet: 1 tab(s) sublingual every 6 hours, As Needed MDD:4   labetalol 100 mg oral tablet: 1 tab(s) orally 2 times a day  Crush after surgery  omeprazole 40 mg oral delayed release capsule: 1 cap(s) orally once a day MDD:1  ondansetron 4 mg oral tablet, disintegratin tab(s) orally every 6 hours, As Needed   Vitamin D3 2000 intl units oral tablet: 1 tab(s) orally once a day  change to chewable after surgery   acetaminophen 500 mg/15 mL oral liquid: 15 milliliter(s) orally every 6 hours, As Needed   enoxaparin 40 mg/0.4 mL injectable solution: 40 milligram(s) subcutaneously once a day   hydrALAZINE 100 mg oral tablet: 1 tab(s) orally 3 times a day  Crush after surgery  hyoscyamine 0.125 mg sublingual tablet: 1 tab(s) sublingual every 6 hours, As Needed MDD:4   labetalol 100 mg oral tablet: 1 tab(s) orally 2 times a day  Crush after surgery  omeprazole 40 mg oral delayed release capsule: 1 cap(s) orally once a day MDD:1  ondansetron 4 mg oral tablet, disintegratin tab(s) orally every 6 hours, As Needed   Vitamin D3 2000 intl units oral tablet: 1 tab(s) orally once a day  change to chewable after surgery   acetaminophen 500 mg/15 mL oral liquid: 15 milliliter(s) orally every 6 hours, As Needed   amLODIPine 5 mg oral tablet: 1 tab(s) orally once a day  enoxaparin 40 mg/0.4 mL injectable solution: 40 milligram(s) subcutaneously once a day   hydrALAZINE 50 mg oral tablet: 1 tab(s) orally once a day  hyoscyamine 0.125 mg sublingual tablet: 1 tab(s) sublingual every 6 hours, As Needed MDD:4   labetalol 100 mg oral tablet: 1 tab(s) orally 2 times a day  Crush after surgery  omeprazole 40 mg oral delayed release capsule: 1 cap(s) orally once a day MDD:1  ondansetron 4 mg oral tablet, disintegratin tab(s) orally every 6 hours, As Needed   Vitamin D3 2000 intl units oral tablet: 1 tab(s) orally once a day  change to chewable after surgery

## 2022-09-22 NOTE — DISCHARGE NOTE PROVIDER - HOSPITAL COURSE
47M with a pmhx significant for morbid obesity, HTN, diverticulitis s/p colectomy 2020.   On 9/21/2022 who has a history of morbid obesity BMI 40.7 underwent laparoscopic sleeve gastrectomy, hiatal hernia repair. Bariatric ERAS protocol followed to include preoperative and perioperative use of DVT, SSI prophylaxis as well as multi-modal non-opioid analgesics. Patient tolerated the procedure well, extubated in the operating room then transferred to the PACU in stable condition. Once hemodynamically stable and effective pain control the patient was able to ambulate with assistance. Pt was also able to void within 4 hours following the procedure. The patient was later transferred to a bariatric unit and placed on bedside continuous pulse oximetry. Postoperative pain management included IV multi-modal non-opioid analgesics. The patient tolerated bariatric clear liquid the evening of surgery.   On POD #1 patient remained stable with no acute events overnight, has effective pain control. Denies nausea and vomiting. Patient is ambulating independently and voiding as expected. The rest of the hospital course was uneventful, patient met 8-Point Bariatric Surgery D/C criteria and subsequently cleared for discharge home on POD#1.    Pt low risk for thromboembolic event (calculated [facility] VTE Risk Stratification Score (0.27% ) and will be discharged home with 2 weeks of Lovenox for DVT prophylaxis. He will follow a protocol-derived staged meal progression supervised by the dietitian in the outpatient setting. Patient will follow-up with Dr. Chow in 7-10 days, medical doctor and dietitian in 30 days. All appropriate prescriptions obtained from [facility] prior to discharge. Written discharge instruction explained and given to include VTE prevention.   47M with a pmhx significant for morbid obesity, HTN, diverticulitis s/p colectomy 2020.     On 9/21/2022 who has a history of morbid obesity BMI 40.7 underwent laparoscopic sleeve gastrectomy, hiatal hernia repair. Bariatric ERAS protocol followed to include preoperative and perioperative use of DVT, SSI prophylaxis as well as multi-modal non-opioid analgesics. Patient tolerated the procedure well, extubated in the operating room then transferred to the PACU in stable condition. Once hemodynamically stable and effective pain control the patient was able to ambulate with assistance. Pt was also able to void within 4 hours following the procedure. The patient was later transferred to a bariatric unit and placed on bedside continuous pulse oximetry. Postoperative pain management included IV multi-modal non-opioid analgesics. The patient tolerated bariatric clear liquid the evening of surgery.     On POD #1 patient remained stable with no acute events overnight, has effective pain control. Denies nausea and vomiting. Patient is ambulating independently and voiding as expected. The rest of the hospital course was uneventful, patient met 8-Point Bariatric Surgery D/C criteria and subsequently cleared for discharge home on POD#1.  Pt low risk for thromboembolic event (calculated [facility] VTE Risk Stratification Score (0.27% ) and will be discharged home with 2 weeks of Lovenox for DVT prophylaxis. He will follow a protocol-derived staged meal progression supervised by the dietitian in the outpatient setting. Patient will follow-up with Dr. Chow in 7-10 days, medical doctor and dietitian in 30 days. All appropriate prescriptions obtained from [facility] prior to discharge. Written discharge instruction explained and given to include VTE prevention.   47M with a pmhx significant for morbid obesity, HTN, diverticulitis s/p colectomy 2020.     On 9/21/2022 who Mr Wilman  underwent laparoscopic sleeve gastrectomy, hiatal hernia repair for morbid obesity, BMI 40.7  Bariatric ERAS protocol followed to include preoperative and perioperative use of DVT, SSI prophylaxis as well as multi-modal non-opioid analgesics. Patient tolerated the procedure well, extubated in the operating room then transferred to the PACU in stable condition. Once hemodynamically stable and effective pain control the patient was able to ambulate with assistance. Pt was also able to void within 4 hours following the procedure. The patient was later transferred to a bariatric unit and placed on bedside continuous pulse oximetry. Postoperative pain management included IV multi-modal non-opioid analgesics. The patient tolerated bariatric clear liquid the evening of surgery.     On POD #1 patient remained stable with no acute events overnight, has effective pain control. Denies nausea and vomiting. Patient is ambulating independently and voiding as expected. The rest of the hospital course was uneventful, patient met 8-Point Bariatric Surgery D/C criteria and subsequently cleared for discharge home on POD#1.  Pt low risk for thromboembolic event (calculated [facility] VTE Risk Stratification Score (0.27% ) and will be discharged home with 2 weeks of Lovenox for DVT prophylaxis. He will follow a protocol-derived staged meal progression supervised by the dietitian in the outpatient setting. Patient will follow-up with Dr. Chow in 7-10 days, medical doctor and dietitian in 30 days. All appropriate prescriptions obtained from [facility] prior to discharge. Written discharge instruction explained and given to include VTE prevention.

## 2022-09-22 NOTE — PROGRESS NOTE ADULT - ASSESSMENT
47M s/p laparoscopic sleeve gastrectomy, laparoscopic lysis of intestinal adhesions.    PLAN:  - IVF ()  - ERP [facility] Protocol  - [facility] clears  - Dispo: planning      [facility]  [id number]. 47M s/p laparoscopic sleeve gastrectomy, laparoscopic lysis of intestinal adhesions.    PLAN:  - IVF ()  - ERP [facility] Protocol  - [facility] clears  - Dispo: DC when praful criteria met      [facility]  [id number].

## 2022-09-22 NOTE — DISCHARGE NOTE PROVIDER - NSDCCPTREATMENT_GEN_ALL_CORE_FT
PRINCIPAL PROCEDURE  Procedure: Laparoscopic sleeve gastrectomy  Findings and Treatment:       SECONDARY PROCEDURE  Procedure: Laparoscopic lysis of intestinal adhesions  Findings and Treatment:

## 2022-09-22 NOTE — DIETITIAN INITIAL EVALUATION ADULT - PERTINENT MEDS FT
MEDICATIONS  (STANDING):  folic acid Injectable 1 milliGRAM(s) IV Push daily  heparin   Injectable 5000 Unit(s) SubCutaneous every 8 hours  hyoscyamine SL 0.125 milliGRAM(s) SubLingual every 6 hours  influenza   Vaccine 0.5 milliLiter(s) IntraMuscular once  ketorolac   Injectable 30 milliGRAM(s) IV Push every 6 hours  labetalol 100 milliGRAM(s) Oral two times a day  lactated ringers. 1000 milliLiter(s) (150 mL/Hr) IV Continuous <Continuous>  ondansetron Injectable 4 milliGRAM(s) IV Push every 6 hours  pantoprazole  Injectable 40 milliGRAM(s) IV Push every 24 hours  sodium chloride 0.9% 1000 milliLiter(s) (250 mL/Hr) IV Continuous <Continuous>    MEDICATIONS  (PRN):

## 2022-09-22 NOTE — DISCHARGE NOTE PROVIDER - NSDCFUSCHEDAPPT_GEN_ALL_CORE_FT
Sigifredo Canales  [facility] Physician [facility]  CARDIOLOGY [address]   Scheduled Appointment: 11/21/2022

## 2022-09-22 NOTE — DISCHARGE NOTE PROVIDER - NSDCCPCAREPLAN_GEN_ALL_CORE_FT
PRINCIPAL DISCHARGE DIAGNOSIS  Diagnosis: BMI 40.0-44.9, adult  Assessment and Plan of Treatment: WOUND CARE: -Covering your incisions are white pieces of tape called steri-strips. You can shower with these and they will curl up and begin to fall off on their own in about 7 days.   BATHING: Please do not submerge wound underwater. You may shower and/or sponge bathe.  ACTIVITY: No heavy lifting or straining. Otherwise, you may return to your usual level of physical activity. If you are taking narcotic pain medication (such as Percocet), do NOT drive a car, operate machinery or make important decisions.  DIET: Bariatric full liquids starting tomorrow x 2 weeks.  Then soft/puree for 30 days.  Please follow up with your dietitian within 30 days.    NOTIFY YOUR SURGEON IF: You have any bleeding that does not stop, any pus draining from your wound, any fever (over 100.4 F) or chills, persistent nausea/vomiting, persistent diarrhea, or if your pain is not controlled on your discharge pain medications.  FOLLOW-UP:  1. Follow-up with Dr. Chow within 1-2 weeks of discharge.  Please call office for appointment  2. Please follow up with your primary care physician in one week regarding your hospitalization.

## 2022-09-23 LAB
ANION GAP SERPL CALC-SCNC: 12 MMOL/L — SIGNIFICANT CHANGE UP (ref 5–17)
BUN SERPL-MCNC: 13 MG/DL — SIGNIFICANT CHANGE UP (ref 7–23)
CALCIUM SERPL-MCNC: 9 MG/DL — SIGNIFICANT CHANGE UP (ref 8.4–10.5)
CHLORIDE SERPL-SCNC: 101 MMOL/L — SIGNIFICANT CHANGE UP (ref 96–108)
CO2 SERPL-SCNC: 26 MMOL/L — SIGNIFICANT CHANGE UP (ref 22–31)
CREAT SERPL-MCNC: 0.99 MG/DL — SIGNIFICANT CHANGE UP (ref 0.5–1.3)
EGFR: 95 ML/MIN/1.73M2 — SIGNIFICANT CHANGE UP
GLUCOSE SERPL-MCNC: 101 MG/DL — HIGH (ref 70–99)
HCT VFR BLD CALC: 38.4 % — LOW (ref 39–50)
HGB BLD-MCNC: 13.4 G/DL — SIGNIFICANT CHANGE UP (ref 13–17)
MAGNESIUM SERPL-MCNC: 1.9 MG/DL — SIGNIFICANT CHANGE UP (ref 1.6–2.6)
MCHC RBC-ENTMCNC: 30.7 PG — SIGNIFICANT CHANGE UP (ref 27–34)
MCHC RBC-ENTMCNC: 34.9 GM/DL — SIGNIFICANT CHANGE UP (ref 32–36)
MCV RBC AUTO: 87.9 FL — SIGNIFICANT CHANGE UP (ref 80–100)
NRBC # BLD: 0 /100 WBCS — SIGNIFICANT CHANGE UP (ref 0–0)
PHOSPHATE SERPL-MCNC: 1.9 MG/DL — LOW (ref 2.5–4.5)
PLATELET # BLD AUTO: 274 K/UL — SIGNIFICANT CHANGE UP (ref 150–400)
POTASSIUM SERPL-MCNC: 3.1 MMOL/L — LOW (ref 3.5–5.3)
POTASSIUM SERPL-SCNC: 3.1 MMOL/L — LOW (ref 3.5–5.3)
RBC # BLD: 4.37 M/UL — SIGNIFICANT CHANGE UP (ref 4.2–5.8)
RBC # FLD: 13.4 % — SIGNIFICANT CHANGE UP (ref 10.3–14.5)
SARS-COV-2 RNA SPEC QL NAA+PROBE: SIGNIFICANT CHANGE UP
SODIUM SERPL-SCNC: 139 MMOL/L — SIGNIFICANT CHANGE UP (ref 135–145)
WBC # BLD: 12.87 K/UL — HIGH (ref 3.8–10.5)
WBC # FLD AUTO: 12.87 K/UL — HIGH (ref 3.8–10.5)

## 2022-09-23 RX ORDER — POTASSIUM PHOSPHATE, MONOBASIC POTASSIUM PHOSPHATE, DIBASIC 236; 224 MG/ML; MG/ML
30 INJECTION, SOLUTION INTRAVENOUS ONCE
Refills: 0 | Status: COMPLETED | OUTPATIENT
Start: 2022-09-23 | End: 2022-09-23

## 2022-09-23 RX ORDER — ACETAMINOPHEN 500 MG
650 TABLET ORAL ONCE
Refills: 0 | Status: COMPLETED | OUTPATIENT
Start: 2022-09-23 | End: 2022-09-23

## 2022-09-23 RX ORDER — HYDRALAZINE HCL 50 MG
10 TABLET ORAL ONCE
Refills: 0 | Status: COMPLETED | OUTPATIENT
Start: 2022-09-23 | End: 2022-09-23

## 2022-09-23 RX ORDER — AMLODIPINE BESYLATE 2.5 MG/1
5 TABLET ORAL DAILY
Refills: 0 | Status: DISCONTINUED | OUTPATIENT
Start: 2022-09-23 | End: 2022-09-24

## 2022-09-23 RX ORDER — POTASSIUM CHLORIDE 20 MEQ
10 PACKET (EA) ORAL
Refills: 0 | Status: COMPLETED | OUTPATIENT
Start: 2022-09-23 | End: 2022-09-23

## 2022-09-23 RX ORDER — HYDRALAZINE HCL 50 MG
50 TABLET ORAL DAILY
Refills: 0 | Status: DISCONTINUED | OUTPATIENT
Start: 2022-09-23 | End: 2022-09-24

## 2022-09-23 RX ORDER — MAGNESIUM SULFATE 500 MG/ML
2 VIAL (ML) INJECTION ONCE
Refills: 0 | Status: COMPLETED | OUTPATIENT
Start: 2022-09-23 | End: 2022-09-23

## 2022-09-23 RX ADMIN — Medication 50 MILLIGRAM(S): at 09:25

## 2022-09-23 RX ADMIN — Medication 100 MILLIGRAM(S): at 17:33

## 2022-09-23 RX ADMIN — Medication 650 MILLIGRAM(S): at 04:04

## 2022-09-23 RX ADMIN — ONDANSETRON 4 MILLIGRAM(S): 8 TABLET, FILM COATED ORAL at 17:33

## 2022-09-23 RX ADMIN — HEPARIN SODIUM 5000 UNIT(S): 5000 INJECTION INTRAVENOUS; SUBCUTANEOUS at 01:01

## 2022-09-23 RX ADMIN — Medication 100 MILLIEQUIVALENT(S): at 10:26

## 2022-09-23 RX ADMIN — Medication 0.12 MILLIGRAM(S): at 05:16

## 2022-09-23 RX ADMIN — AMLODIPINE BESYLATE 5 MILLIGRAM(S): 2.5 TABLET ORAL at 09:23

## 2022-09-23 RX ADMIN — PANTOPRAZOLE SODIUM 40 MILLIGRAM(S): 20 TABLET, DELAYED RELEASE ORAL at 15:44

## 2022-09-23 RX ADMIN — Medication 0.12 MILLIGRAM(S): at 12:24

## 2022-09-23 RX ADMIN — ONDANSETRON 4 MILLIGRAM(S): 8 TABLET, FILM COATED ORAL at 12:25

## 2022-09-23 RX ADMIN — Medication 10 MILLIGRAM(S): at 22:55

## 2022-09-23 RX ADMIN — Medication 100 MILLIGRAM(S): at 05:17

## 2022-09-23 RX ADMIN — Medication 100 MILLIEQUIVALENT(S): at 09:07

## 2022-09-23 RX ADMIN — Medication 0.12 MILLIGRAM(S): at 22:57

## 2022-09-23 RX ADMIN — HEPARIN SODIUM 5000 UNIT(S): 5000 INJECTION INTRAVENOUS; SUBCUTANEOUS at 17:32

## 2022-09-23 RX ADMIN — Medication 0.12 MILLIGRAM(S): at 17:32

## 2022-09-23 RX ADMIN — SODIUM CHLORIDE 150 MILLILITER(S): 9 INJECTION, SOLUTION INTRAVENOUS at 09:24

## 2022-09-23 RX ADMIN — HEPARIN SODIUM 5000 UNIT(S): 5000 INJECTION INTRAVENOUS; SUBCUTANEOUS at 09:23

## 2022-09-23 RX ADMIN — ONDANSETRON 4 MILLIGRAM(S): 8 TABLET, FILM COATED ORAL at 05:16

## 2022-09-23 RX ADMIN — Medication 100 MILLIEQUIVALENT(S): at 12:14

## 2022-09-23 RX ADMIN — Medication 25 GRAM(S): at 09:03

## 2022-09-23 RX ADMIN — POTASSIUM PHOSPHATE, MONOBASIC POTASSIUM PHOSPHATE, DIBASIC 83.33 MILLIMOLE(S): 236; 224 INJECTION, SOLUTION INTRAVENOUS at 12:14

## 2022-09-23 RX ADMIN — Medication 650 MILLIGRAM(S): at 04:34

## 2022-09-23 RX ADMIN — ONDANSETRON 4 MILLIGRAM(S): 8 TABLET, FILM COATED ORAL at 22:57

## 2022-09-23 RX ADMIN — Medication 1 MILLIGRAM(S): at 15:42

## 2022-09-23 NOTE — PROGRESS NOTE ADULT - ASSESSMENT
Assessment and Plan:   	  47M s/p laparoscopic sleeve gastrectomy, laparoscopic lysis of intestinal adhesions.    PLAN:  - IVF ()  - ERP [facility] Protocol  - [facility] clears  - Dispo: DC when [facility] criteria met      [facility]  [id number].   Assessment and Plan:   	  47M s/p laparoscopic sleeve gastrectomy, laparoscopic lysis of intestinal adhesions.    PLAN:  - IVF ()  - ERP [facility] Protocol  - [facility] clears  - Begin Home medications (amlodipine, labetalol, and hydralazine)  - AM labs  - Dispo: DC when praful criteria met      [facility]  [id number].

## 2022-09-23 NOTE — PROGRESS NOTE ADULT - SUBJECTIVE AND OBJECTIVE BOX
Pt said about a month ago she was dx with ringworm. Pt was advised to use OTC antifugal cream. Pt said she has been using it twice a day. Pt said the ringworm is not as prominent but it is still there.   Pt said Dr Glynn advised if this did not clear to call the office.   Pt is aware Dr Glynn out of the office, pt asking if possible to consult another pcp.       Please advise, thank you    Post Op Day#: 1    Subjective: "No N/V, belching"    Objective: No acute events overnight. Effective pain control, denies nausea vomiting, Tolerating bariatric clear liquid diet.. Continuous pulse oximetry at bedside functioning,  Mildly elevated blood pressure, HR stable, afebrile. Labs within acceptable range.  Ambulated independently around the unit and voiding.                                                Vital Signs Last 24 Hrs  T(C): 36.9 (23 Sep 2022 08:12), Max: 37.3 (23 Sep 2022 01:18)  T(F): 98.5 (23 Sep 2022 08:12), Max: 99.2 (23 Sep 2022 01:18)  HR: 75 (23 Sep 2022 08:12) (69 - 92)  BP: 171/109 (23 Sep 2022 11:29) (162/100 - 173/101)  BP(mean): --  RR: 18 (23 Sep 2022 08:12) (18 - 19)  SpO2: 98% (23 Sep 2022 08:12) (95% - 98%)    Parameters below as of 23 Sep 2022 08:12  Patient On (Oxygen Delivery Method): room air                                                   I&O's Summary    22 Sep 2022 07:01  -  23 Sep 2022 07:00  --------------------------------------------------------  IN: 3860 mL / OUT: 2050 mL / NET: 1810 mL    23 Sep 2022 07:01  -  23 Sep 2022 11:35  --------------------------------------------------------  IN: 120 mL / OUT: 300 mL / NET: -180 mL                                                                          13.4   12.87 )-----------( 274      ( 23 Sep 2022 06:28 )             38.4                                                 09-23    139  |  101  |  13  ----------------------------<  101<H>  3.1<L>   |  26  |  0.99    Ca    9.0      23 Sep 2022 06:30  Phos  1.9     09-23  Mg     1.9     09-23      amLODIPine   Tablet 5 milliGRAM(s) Oral daily  folic acid Injectable 1 milliGRAM(s) IV Push daily  heparin   Injectable 5000 Unit(s) SubCutaneous every 8 hours  hydrALAZINE 50 milliGRAM(s) Oral daily  hyoscyamine SL 0.125 milliGRAM(s) SubLingual every 6 hours  influenza   Vaccine 0.5 milliLiter(s) IntraMuscular once  labetalol 100 milliGRAM(s) Oral two times a day  lactated ringers. 1000 milliLiter(s) IV Continuous <Continuous>  ondansetron Injectable 4 milliGRAM(s) IV Push every 6 hours  pantoprazole  Injectable 40 milliGRAM(s) IV Push every 24 hours  potassium chloride  10 mEq/100 mL IVPB 10 milliEquivalent(s) IV Intermittent every 1 hour  potassium phosphate IVPB 30 milliMole(s) IV Intermittent once  sodium chloride 0.9% 1000 milliLiter(s) IV Continuous <Continuous>      Physical Exam:         Lungs:  clear breath sounds b/l       Heart:  Regular rate & rhythm       Abdomen:  Soft, mildly-distended.  Scopes sites clean, dry and intact. + bs, - flatus, no rebound or guarding       Skin:  intact, pannus w/o rash       Extremities: + pulses, no edema, no calf tenderness, negative efrem's     VTE Extended Risk Assessment Scores             [facility]  VTE Predicted RISK Low:   ( <1%),     Assessment and Plan: Lap Sleeve Gastrectomy, Incisional Hernia Repair, Extensive EM    - Bariatric Clear diet today then protocol derived staged meal progression supervised by RD in outpatient setting  - DVT - LMWH (patient education with teach back). GI prophylaxis, Incentive spirometry  - Ambulate as tolerated  - Procedure specific education including postop complications, medications and side effects, diet, vitamins and VTE prevention. Written materials given  - Medication reviewed and reconciled, Bariatric meds obtained from [facility] prior to d/c  - D/C home once Bariatric 8-Point d/c criteria met  - Follow up with Dr Chow in 7-10 days, Dietitian and PMD in 30 days.      Lizzy Zurita, NIDHI, ANP  [phone] Post Op Day#: 1    Subjective: "No N/V, belching"    Objective: No acute events overnight. Effective pain control, denies nausea vomiting, Tolerating bariatric clear liquid diet.. Continuous pulse oximetry at bedside functioning,  Mildly elevated blood pressure, HR stable, afebrile. Hypokalemia and Hypophosphatemia  .  Ambulated independently around the unit and voiding.                                                Vital Signs Last 24 Hrs  T(C): 36.9 (23 Sep 2022 08:12), Max: 37.3 (23 Sep 2022 01:18)  T(F): 98.5 (23 Sep 2022 08:12), Max: 99.2 (23 Sep 2022 01:18)  HR: 75 (23 Sep 2022 08:12) (69 - 92)  BP: 171/109 (23 Sep 2022 11:29) (162/100 - 173/101)  BP(mean): --  RR: 18 (23 Sep 2022 08:12) (18 - 19)  SpO2: 98% (23 Sep 2022 08:12) (95% - 98%)    Parameters below as of 23 Sep 2022 08:12  Patient On (Oxygen Delivery Method): room air                                                   I&O's Summary    22 Sep 2022 07:01  -  23 Sep 2022 07:00  --------------------------------------------------------  IN: 3860 mL / OUT: 2050 mL / NET: 1810 mL    23 Sep 2022 07:01  -  23 Sep 2022 11:35  --------------------------------------------------------  IN: 120 mL / OUT: 300 mL / NET: -180 mL                                                                          13.4   12.87 )-----------( 274      ( 23 Sep 2022 06:28 )             38.4                                                 09-23    139  |  101  |  13  ----------------------------<  101<H>  3.1<L>   |  26  |  0.99    Ca    9.0      23 Sep 2022 06:30  Phos  1.9     09-23  Mg     1.9     09-23      amLODIPine   Tablet 5 milliGRAM(s) Oral daily  folic acid Injectable 1 milliGRAM(s) IV Push daily  heparin   Injectable 5000 Unit(s) SubCutaneous every 8 hours  hydrALAZINE 50 milliGRAM(s) Oral daily  hyoscyamine SL 0.125 milliGRAM(s) SubLingual every 6 hours  influenza   Vaccine 0.5 milliLiter(s) IntraMuscular once  labetalol 100 milliGRAM(s) Oral two times a day  lactated ringers. 1000 milliLiter(s) IV Continuous <Continuous>  ondansetron Injectable 4 milliGRAM(s) IV Push every 6 hours  pantoprazole  Injectable 40 milliGRAM(s) IV Push every 24 hours  potassium chloride  10 mEq/100 mL IVPB 10 milliEquivalent(s) IV Intermittent every 1 hour  potassium phosphate IVPB 30 milliMole(s) IV Intermittent once  sodium chloride 0.9% 1000 milliLiter(s) IV Continuous <Continuous>      Physical Exam:         Lungs:  clear breath sounds b/l       Heart:  Regular rate & rhythm       Abdomen:  Soft, mildly-distended.  Scopes sites clean, dry and intact. + bs, - flatus, no rebound or guarding       Skin:  intact, pannus w/o rash       Extremities: + pulses, no edema, no calf tenderness, negative efrem's     VTE Extended Risk Assessment Scores             [facility]  VTE Predicted RISK Low:   ( <1%),     Assessment and Plan: Lap Sleeve Gastrectomy, Incisional Hernia Repair, Extensive EM    - Bariatric Clear diet today then protocol derived staged meal progression supervised by RD in outpatient setting  - DVT - LMWH (patient education with teach back). GI prophylaxis, Incentive spirometry  - Ambulate as tolerated  - Electrolytes replacement  - Procedure specific education including postop complications, medications and side effects, diet, vitamins and VTE prevention. Written materials given  - Medication reviewed and reconciled, Bariatric meds obtained from [facility] prior to d/c  - D/C home once Bariatric 8-Point d/c criteria met  - Follow up with Dr Chow in 7-10 days, Dietitian and PMD in 30 days.      Lizzy Zurita, NIDHI, ANP  [phone]

## 2022-09-23 NOTE — PROGRESS NOTE ADULT - SUBJECTIVE AND OBJECTIVE BOX
POD #2 Lap gastric sleeve    Overnight events:   - No acute events    SUBJECTIVE:  Pt seen and examined, and is resting comfortably in bed. Pt denies CP, SOB, headaches, changes in vision.    OBJECTIVE:  Vital Signs Last 24 Hrs  T(C): 36.9 (23 Sep 2022 04:15), Max: 37.3 (23 Sep 2022 01:18)  T(F): 98.4 (23 Sep 2022 04:15), Max: 99.2 (23 Sep 2022 01:18)  HR: 92 (23 Sep 2022 04:15) (69 - 92)  BP: 167/90 (23 Sep 2022 04:15) (162/100 - 173/101)  BP(mean): --  RR: 19 (23 Sep 2022 04:15) (18 - 19)  SpO2: 98% (23 Sep 2022 04:15) (94% - 98%)    Parameters below as of 23 Sep 2022 04:15  Patient On (Oxygen Delivery Method): room air      Physical Examination:  GEN: NAD, resting quietly, A&Ox3  PULM: symmetric chest rise bilaterally, no increased WOB  ABD: soft, nontender, nondistended, no rebound or guarding.  EXTR: no LE erythema, moving all extremities      LABS:                                   14.2   16.13 )-----------( 299      ( 22 Sep 2022 08:37 )             41.6   BMI: BMI (kg/m2): 40.7 (09-21-22 @ 09:11)  HbA1c: A1C with Estimated Average Glucose Result: 5.2 % (09-07-22 @ 17:07)    Glucose: POCT Blood Glucose.: 80 mg/dL (09-21-22 @ 08:01)    BP: 167/90 (09-23-22 @ 04:15) (121/76 - 176/95)  Lipid Panel:        Subjective   POD #2 Lap gastric sleeve    Overnight events:   - No acute events    SUBJECTIVE:  Pt seen and examined, and is resting comfortably in bed. Pt denies CP, SOB, headaches, changes in vision. +ve,-ve BM.    OBJECTIVE:  Vital Signs Last 24 Hrs  T(C): 36.9 (23 Sep 2022 04:15), Max: 37.3 (23 Sep 2022 01:18)  T(F): 98.4 (23 Sep 2022 04:15), Max: 99.2 (23 Sep 2022 01:18)  HR: 92 (23 Sep 2022 04:15) (69 - 92)  BP: 167/90 (23 Sep 2022 04:15) (162/100 - 173/101)  BP(mean): --  RR: 19 (23 Sep 2022 04:15) (18 - 19)  SpO2: 98% (23 Sep 2022 04:15) (94% - 98%)    Parameters below as of 23 Sep 2022 04:15  Patient On (Oxygen Delivery Method): room air      Physical Examination:  GEN: NAD, resting quietly, A&Ox3  PULM: symmetric chest rise bilaterally, no increased WOB  ABD: soft, nontender, nondistended, no rebound or guarding.  EXTR: no LE erythema, moving all extremities      LABS:                                   14.2   16.13 )-----------( 299      ( 22 Sep 2022 08:37 )             41.6   BMI: BMI (kg/m2): 40.7 (09-21-22 @ 09:11)  HbA1c: A1C with Estimated Average Glucose Result: 5.2 % (09-07-22 @ 17:07)    Glucose: POCT Blood Glucose.: 80 mg/dL (09-21-22 @ 08:01)    BP: 167/90 (09-23-22 @ 04:15) (121/76 - 176/95)  Lipid Panel:

## 2022-09-23 NOTE — PHARMACOTHERAPY INTERVENTION NOTE - COMMENTS
Spoke to patient about absorption issues with medications and the need to crush tablets or open capsules for the next 30 days. Reinforced discussion points from previous counseling (crush hydralazine and labetalol; switch nifedipine ER to amlodipine 5mg daily per PCP). Informed patient of new prescriptions sent to pharmacy.    Saul Bustamante, PharmD, BCPS  [phone]  Available on [employer] Teams

## 2022-09-23 NOTE — PROGRESS NOTE ADULT - REASON FOR ADMISSION
Lap Sleeve Gastrectomy, Incisional Hernia Repair, Extensive EM
Lap Lysis of Adhesion, Incisional Hernia Repair. Sleeve Gastrectomy

## 2022-09-24 ENCOUNTER — TRANSCRIPTION ENCOUNTER (OUTPATIENT)
Age: 47
End: 2022-09-24

## 2022-09-24 VITALS — HEART RATE: 71 BPM | DIASTOLIC BLOOD PRESSURE: 93 MMHG | SYSTOLIC BLOOD PRESSURE: 155 MMHG

## 2022-09-24 LAB
ANION GAP SERPL CALC-SCNC: 11 MMOL/L — SIGNIFICANT CHANGE UP (ref 5–17)
BUN SERPL-MCNC: 11 MG/DL — SIGNIFICANT CHANGE UP (ref 7–23)
CALCIUM SERPL-MCNC: 9.5 MG/DL — SIGNIFICANT CHANGE UP (ref 8.4–10.5)
CHLORIDE SERPL-SCNC: 100 MMOL/L — SIGNIFICANT CHANGE UP (ref 96–108)
CO2 SERPL-SCNC: 28 MMOL/L — SIGNIFICANT CHANGE UP (ref 22–31)
CREAT SERPL-MCNC: 0.91 MG/DL — SIGNIFICANT CHANGE UP (ref 0.5–1.3)
EGFR: 105 ML/MIN/1.73M2 — SIGNIFICANT CHANGE UP
GLUCOSE SERPL-MCNC: 90 MG/DL — SIGNIFICANT CHANGE UP (ref 70–99)
HCT VFR BLD CALC: 37.2 % — LOW (ref 39–50)
HGB BLD-MCNC: 12.9 G/DL — LOW (ref 13–17)
MAGNESIUM SERPL-MCNC: 1.9 MG/DL — SIGNIFICANT CHANGE UP (ref 1.6–2.6)
MCHC RBC-ENTMCNC: 30.9 PG — SIGNIFICANT CHANGE UP (ref 27–34)
MCHC RBC-ENTMCNC: 34.7 GM/DL — SIGNIFICANT CHANGE UP (ref 32–36)
MCV RBC AUTO: 89 FL — SIGNIFICANT CHANGE UP (ref 80–100)
NRBC # BLD: 0 /100 WBCS — SIGNIFICANT CHANGE UP (ref 0–0)
PHOSPHATE SERPL-MCNC: 2.8 MG/DL — SIGNIFICANT CHANGE UP (ref 2.5–4.5)
PLATELET # BLD AUTO: 262 K/UL — SIGNIFICANT CHANGE UP (ref 150–400)
POTASSIUM SERPL-MCNC: 3.3 MMOL/L — LOW (ref 3.5–5.3)
POTASSIUM SERPL-SCNC: 3.3 MMOL/L — LOW (ref 3.5–5.3)
RBC # BLD: 4.18 M/UL — LOW (ref 4.2–5.8)
RBC # FLD: 13.2 % — SIGNIFICANT CHANGE UP (ref 10.3–14.5)
SODIUM SERPL-SCNC: 139 MMOL/L — SIGNIFICANT CHANGE UP (ref 135–145)
WBC # BLD: 10.95 K/UL — HIGH (ref 3.8–10.5)
WBC # FLD AUTO: 10.95 K/UL — HIGH (ref 3.8–10.5)

## 2022-09-24 PROCEDURE — 36415 COLL VENOUS BLD VENIPUNCTURE: CPT

## 2022-09-24 PROCEDURE — 88307 TISSUE EXAM BY PATHOLOGIST: CPT

## 2022-09-24 PROCEDURE — 82962 GLUCOSE BLOOD TEST: CPT

## 2022-09-24 PROCEDURE — 82330 ASSAY OF CALCIUM: CPT

## 2022-09-24 PROCEDURE — 85018 HEMOGLOBIN: CPT

## 2022-09-24 PROCEDURE — C9399: CPT

## 2022-09-24 PROCEDURE — 85025 COMPLETE CBC W/AUTO DIFF WBC: CPT

## 2022-09-24 PROCEDURE — 84295 ASSAY OF SERUM SODIUM: CPT

## 2022-09-24 PROCEDURE — 94660 CPAP INITIATION&MGMT: CPT

## 2022-09-24 PROCEDURE — 84132 ASSAY OF SERUM POTASSIUM: CPT

## 2022-09-24 PROCEDURE — 83605 ASSAY OF LACTIC ACID: CPT

## 2022-09-24 PROCEDURE — 82803 BLOOD GASES ANY COMBINATION: CPT

## 2022-09-24 PROCEDURE — 83735 ASSAY OF MAGNESIUM: CPT

## 2022-09-24 PROCEDURE — 84100 ASSAY OF PHOSPHORUS: CPT

## 2022-09-24 PROCEDURE — U0005: CPT

## 2022-09-24 PROCEDURE — 85027 COMPLETE CBC AUTOMATED: CPT

## 2022-09-24 PROCEDURE — 80048 BASIC METABOLIC PNL TOTAL CA: CPT

## 2022-09-24 PROCEDURE — U0003: CPT

## 2022-09-24 PROCEDURE — 82435 ASSAY OF BLOOD CHLORIDE: CPT

## 2022-09-24 PROCEDURE — 82947 ASSAY GLUCOSE BLOOD QUANT: CPT

## 2022-09-24 PROCEDURE — C1889: CPT

## 2022-09-24 PROCEDURE — 85014 HEMATOCRIT: CPT

## 2022-09-24 RX ORDER — HYDRALAZINE HCL 50 MG
1 TABLET ORAL
Qty: 0 | Refills: 0 | DISCHARGE
Start: 2022-09-24

## 2022-09-24 RX ORDER — AMLODIPINE BESYLATE 2.5 MG/1
1 TABLET ORAL
Qty: 0 | Refills: 0 | DISCHARGE
Start: 2022-09-24

## 2022-09-24 RX ORDER — HYDRALAZINE HCL 50 MG
1 TABLET ORAL
Qty: 0 | Refills: 0 | DISCHARGE

## 2022-09-24 RX ORDER — MAGNESIUM SULFATE 500 MG/ML
2 VIAL (ML) INJECTION ONCE
Refills: 0 | Status: COMPLETED | OUTPATIENT
Start: 2022-09-24 | End: 2022-09-24

## 2022-09-24 RX ORDER — POTASSIUM PHOSPHATE, MONOBASIC POTASSIUM PHOSPHATE, DIBASIC 236; 224 MG/ML; MG/ML
15 INJECTION, SOLUTION INTRAVENOUS ONCE
Refills: 0 | Status: COMPLETED | OUTPATIENT
Start: 2022-09-24 | End: 2022-09-24

## 2022-09-24 RX ORDER — POTASSIUM CHLORIDE 20 MEQ
10 PACKET (EA) ORAL
Refills: 0 | Status: COMPLETED | OUTPATIENT
Start: 2022-09-24 | End: 2022-09-24

## 2022-09-24 RX ADMIN — Medication 0.12 MILLIGRAM(S): at 05:11

## 2022-09-24 RX ADMIN — POTASSIUM PHOSPHATE, MONOBASIC POTASSIUM PHOSPHATE, DIBASIC 62.5 MILLIMOLE(S): 236; 224 INJECTION, SOLUTION INTRAVENOUS at 11:49

## 2022-09-24 RX ADMIN — Medication 25 GRAM(S): at 10:40

## 2022-09-24 RX ADMIN — Medication 0.12 MILLIGRAM(S): at 11:57

## 2022-09-24 RX ADMIN — AMLODIPINE BESYLATE 5 MILLIGRAM(S): 2.5 TABLET ORAL at 05:12

## 2022-09-24 RX ADMIN — Medication 1 MILLIGRAM(S): at 13:29

## 2022-09-24 RX ADMIN — Medication 100 MILLIEQUIVALENT(S): at 13:29

## 2022-09-24 RX ADMIN — Medication 0.12 MILLIGRAM(S): at 17:12

## 2022-09-24 RX ADMIN — HEPARIN SODIUM 5000 UNIT(S): 5000 INJECTION INTRAVENOUS; SUBCUTANEOUS at 00:10

## 2022-09-24 RX ADMIN — Medication 100 MILLIEQUIVALENT(S): at 10:40

## 2022-09-24 RX ADMIN — ONDANSETRON 4 MILLIGRAM(S): 8 TABLET, FILM COATED ORAL at 05:11

## 2022-09-24 RX ADMIN — ONDANSETRON 4 MILLIGRAM(S): 8 TABLET, FILM COATED ORAL at 17:10

## 2022-09-24 RX ADMIN — Medication 100 MILLIGRAM(S): at 05:11

## 2022-09-24 RX ADMIN — HEPARIN SODIUM 5000 UNIT(S): 5000 INJECTION INTRAVENOUS; SUBCUTANEOUS at 10:40

## 2022-09-24 RX ADMIN — Medication 100 MILLIEQUIVALENT(S): at 11:48

## 2022-09-24 RX ADMIN — Medication 100 MILLIGRAM(S): at 17:08

## 2022-09-24 RX ADMIN — HEPARIN SODIUM 5000 UNIT(S): 5000 INJECTION INTRAVENOUS; SUBCUTANEOUS at 16:02

## 2022-09-24 RX ADMIN — ONDANSETRON 4 MILLIGRAM(S): 8 TABLET, FILM COATED ORAL at 11:58

## 2022-09-24 RX ADMIN — Medication 50 MILLIGRAM(S): at 05:11

## 2022-09-24 RX ADMIN — PANTOPRAZOLE SODIUM 40 MILLIGRAM(S): 20 TABLET, DELAYED RELEASE ORAL at 16:02

## 2022-09-24 NOTE — PROVIDER CONTACT NOTE (OTHER) - SITUATION
Pts blood pressure is 170/109
Pts /98
Pts blood pressure is 160/106
Pts blood pressure is 167/103
Pts blood pressure is 169/104
Pts blood pressure is 173/101
Pts blood pressure is 172/107 post 10mg Hydralazine IVP
Pt complains of headache
Pts blood pressure is 175/104

## 2022-09-24 NOTE — DISCHARGE NOTE NURSING/CASE MANAGEMENT/SOCIAL WORK - PATIENT PORTAL LINK FT
You can access the FollowMyHealth Patient Portal offered by [facility] by registering at the following website: [email or web address]. By joining [facility]’s FollowMyHealth portal, you will also be able to view your health information using other applications (apps) compatible with our system.

## 2022-09-24 NOTE — PROVIDER CONTACT NOTE (OTHER) - DATE AND TIME:
22-Sep-2022 21:50
24-Sep-2022 23:45
23-Sep-2022 18:18
23-Sep-2022 22:00
23-Sep-2022 03:50
22-Sep-2022 22:50
23-Sep-2022 20:00
24-Sep-2022 04:26
22-Sep-2022 18:00

## 2022-09-24 NOTE — PROVIDER CONTACT NOTE (OTHER) - ASSESSMENT
All other VSS. Pts blood pressure is 167/103 Pt denies SOB, Dizziness, headache. Pt is asymptomatic.
All other VSS. Pts blood pressure is 172/107 Pt denies SOB, Dizziness, headache. Pt is asymptomatic.
Pt denies SOB, dizziness, headache. Pt is asymptomatic. All VSS. Pts blood pressure is 175/104
pt denies SOB, dizziness, headache. Pt asymptomatic. All other VSS. Pts blood pressure is 169/104
pt denies SOB, dizziness, headache. Pt is asymptomatic. Pts blood pressure is 170/109 all other VSS.
All other VSS. Pt denies headache, lightheadedness, blurry vision.
All other VSS. pts blood pressure is 173/101. Pt denies SOB, headache, dizziness. pt is asymptomatic.
Pt complains of headache. All VSS MD okay with blood pressure of 168/100. Pt denies dizziness, SOB.
Pt denies headache, SOB, dizziness. All other VSS. Pts blood pressure is 160/106. Pt is asymptomatic.

## 2022-09-24 NOTE — PROGRESS NOTE ADULT - SUBJECTIVE AND OBJECTIVE BOX
Subjective   POD #2 Lap gastric sleeve    Overnight events:   - No acute events    SUBJECTIVE:  Pt seen and examined, and is resting comfortably in bed. Pt denies CP, SOB, headaches, changes in vision. +ve,-ve BM.    OBJECTIVE:  Vital Signs Last 24 Hrs  T(C): 36.9 (23 Sep 2022 04:15), Max: 37.3 (23 Sep 2022 01:18)  T(F): 98.4 (23 Sep 2022 04:15), Max: 99.2 (23 Sep 2022 01:18)  HR: 92 (23 Sep 2022 04:15) (69 - 92)  BP: 167/90 (23 Sep 2022 04:15) (162/100 - 173/101)  BP(mean): --  RR: 19 (23 Sep 2022 04:15) (18 - 19)  SpO2: 98% (23 Sep 2022 04:15) (94% - 98%)    Parameters below as of 23 Sep 2022 04:15  Patient On (Oxygen Delivery Method): room air      Physical Examination:  GEN: NAD, resting quietly, A&Ox3  PULM: symmetric chest rise bilaterally, no increased WOB  ABD: soft, nontender, nondistended, no rebound or guarding.  EXTR: no LE erythema, moving all extremities      LABS:                                   14.2   16.13 )-----------( 299      ( 22 Sep 2022 08:37 )             41.6   BMI: BMI (kg/m2): 40.7 (09-21-22 @ 09:11)  HbA1c: A1C with Estimated Average Glucose Result: 5.2 % (09-07-22 @ 17:07)    Glucose: POCT Blood Glucose.: 80 mg/dL (09-21-22 @ 08:01)    BP: 167/90 (09-23-22 @ 04:15) (121/76 - 176/95)  Lipid Panel:           Assessment and Plan:   · Assessment	    Assessment and Plan:   	  47M s/p laparoscopic sleeve gastrectomy, laparoscopic lysis of intestinal adhesions.    PLAN:  - IVF ()  - ERP Praful Protocol  - Praful clears  - Begin Home medications (amlodipine, labetalol, and hydralazine)  - AM labs  - Dispo: DC when praful criteria met      [facility]  [id number].   Subjective   POD #2 Lap gastric sleeve    Overnight events:   - No acute events    SUBJECTIVE:  Pt seen and examined, and is resting comfortably in bed. Pt denies CP, SOB, headaches, changes in vision. +ve,-ve BM.    OBJECTIVE:  Vital Signs Last 24 Hrs  T(C): 36.9 (23 Sep 2022 04:15), Max: 37.3 (23 Sep 2022 01:18)  T(F): 98.4 (23 Sep 2022 04:15), Max: 99.2 (23 Sep 2022 01:18)  HR: 92 (23 Sep 2022 04:15) (69 - 92)  BP: 167/90 (23 Sep 2022 04:15) (162/100 - 173/101)  BP(mean): --  RR: 19 (23 Sep 2022 04:15) (18 - 19)  SpO2: 98% (23 Sep 2022 04:15) (94% - 98%)    Parameters below as of 23 Sep 2022 04:15  Patient On (Oxygen Delivery Method): room air      Physical Examination:  GEN: NAD, resting quietly, A&Ox3  PULM: symmetric chest rise bilaterally, no increased WOB  ABD: soft, nontender, nondistended, no rebound or guarding.  EXTR: no LE erythema, moving all extremities      LABS:                                   14.2   16.13 )-----------( 299      ( 22 Sep 2022 08:37 )             41.6   BMI: BMI (kg/m2): 40.7 (09-21-22 @ 09:11)  HbA1c: A1C with Estimated Average Glucose Result: 5.2 % (09-07-22 @ 17:07)    Glucose: POCT Blood Glucose.: 80 mg/dL (09-21-22 @ 08:01)    BP: 167/90 (09-23-22 @ 04:15) (121/76 - 176/95)  Lipid Panel:           Assessment  47M s/p laparoscopic sleeve gastrectomy, laparoscopic lysis of intestinal adhesions.    PLAN:  - IVF ()  - ERP Praful Protocol  - Praful clears  - Begin Home medications (amlodipine, labetalol, and hydralazine)  - AM labs  - Dispo: DC when praful criteria met  - Potential discharge today      [facility]  [id number].

## 2022-09-24 NOTE — PROVIDER CONTACT NOTE (OTHER) - REASON
Pt complains of headache
Pts blood pressure is 167/103
Pts blood pressure is 172/107 post 10mg Hydralazine IVP
Pts blood pressure 169/104
pts blood pressure is 160/106
Pts /98
Pts blood pressure is 170/109
Pts blood pressure is 173/101
pts blood pressure is 175/104

## 2022-09-24 NOTE — PROVIDER CONTACT NOTE (OTHER) - BACKGROUND
Patient had a LAP sleeve w/ hernia repair on 9/21. Hx of HTN (on BP meds at home).
Pt admitted for laproscopic sleeve gastrectomy with hernia repair
Pt admitted for laparoscopic sleeve gastrectomy
pt admitted for laparoscopic sleeve gastrectomy with hernia repair.
Pt admitted for laparoscopic sleeve gastrectomy with hernia repair
Pt admitted for laparoscopic sleeve gastrostomy with hernia repair
pt admitted for laprscopic sleeve gastrectomy with hernia repair

## 2022-09-24 NOTE — PROVIDER CONTACT NOTE (OTHER) - ACTION/TREATMENT ORDERED:
No interventions at this time. Pt previously given PO Labetalol. Reassess blood pressure in 30-45 minutes.
MD at bedside to assess pt. Will discuss with pharmacy restarting home blood pressure medications via IV. Pending orders.
MD at bedside to assess pt. IVP Hydralazine to be given and vital reassessed in 20 minutes.
MD made aware, manual /100- MD made aware and ok w BP.
No interventions at this time. Administer AM blood pressure medications at their scheduled time.
10mg Hydralazine to be ordered. Reassess vitals 30 minutes after giving
md aware. repeat BP will be done around 19:00
No interventions at this time. Reassess blood pressure with next set of vitals.
MD at bedside tylenol to be ordered

## 2022-09-24 NOTE — PROGRESS NOTE ADULT - ATTENDING COMMENTS
POD2 s/p lap sleeve gastrectomy  Postop ileus, slowly resolving  Tolerating sips of clears  + flatus yesterday    NAD  Abdomen soft  Inc c/d/i    Cont bariatric protocol  Encouraged ambulation  Discharge when criteria met    Van Chow MD
(+) BM - Home
POD1 s/p sleeve gastrectomy, EM  Doing well, no acute events  Bernardo sips    Vitals normal  Abdomen soft  Inc c/d/i    Cont bariatric protocol  Encouraged ambulation  Discharge when criteria met    Van Chow MD

## 2022-09-24 NOTE — PROVIDER CONTACT NOTE (OTHER) - RECOMMENDATIONS
IVP blood pressure medication??
IVP hydralazine and MD to assess pt at bedside.
IV blood pressure medication?
MD at bedside to assess pt. Tylenol??
MD contacted. Oral labetalol given.
IVP blood pressure medication?
Oral labetalol given as ordered. MD contacted.
Give AM blood pressure medications early??
MD to bedside to assess pt.

## 2022-09-24 NOTE — PROVIDER CONTACT NOTE (OTHER) - NAME OF MD/NP/PA/DO NOTIFIED:
Leon Stone
Leon Stone
MD Leon James
Leon Stone

## 2022-09-26 LAB — SURGICAL PATHOLOGY STUDY: SIGNIFICANT CHANGE UP

## 2022-10-03 ENCOUNTER — APPOINTMENT (OUTPATIENT)
Dept: SURGERY | Facility: CLINIC | Age: 47
End: 2022-10-03

## 2022-10-03 VITALS
OXYGEN SATURATION: 96 % | SYSTOLIC BLOOD PRESSURE: 169 MMHG | WEIGHT: 268.31 LBS | HEART RATE: 65 BPM | RESPIRATION RATE: 17 BRPM | HEIGHT: 70 IN | TEMPERATURE: 97.3 F | BODY MASS INDEX: 38.41 KG/M2 | DIASTOLIC BLOOD PRESSURE: 110 MMHG

## 2022-10-03 PROCEDURE — 99024 POSTOP FOLLOW-UP VISIT: CPT

## 2022-10-03 NOTE — HISTORY OF PRESENT ILLNESS
[de-identified] : Balta is a 47 year old male here for a post operative visit. S/P laparoscopic sleeve gastrectomy, laparoscopic lysis of adhesions, and laparoscopic repair of ventral incisional hernia on 9/21/22. \par \par Recovering comfortably without major complaints.  Minimal incisional pain.  No nausea.  Tolerating bariatric liquids, including protein shakes, without dysphagia or GERD.  Ambulating well.  No fevers or chills reported.\par

## 2022-10-03 NOTE — PHYSICAL EXAM
[Obese, well nourished, in no acute distress] : obese, well nourished, in no acute distress [Normal] : affect appropriate [de-identified] : Normal respirations [de-identified] : Soft, nontender, nondistended.  Incisions well-healed.

## 2022-10-03 NOTE — HISTORY OF PRESENT ILLNESS
[de-identified] : Balta is a 47 year old male here for a post operative visit. S/P laparoscopic sleeve gastrectomy, laparoscopic lysis of adhesions, and laparoscopic repair of ventral incisional hernia on 9/21/22. \par \par Recovering comfortably without major complaints.  Minimal incisional pain.  No nausea.  Tolerating bariatric liquids, including protein shakes, without dysphagia or GERD.  Ambulating well.  No fevers or chills reported.\par

## 2022-10-03 NOTE — PHYSICAL EXAM
[Obese, well nourished, in no acute distress] : obese, well nourished, in no acute distress [Normal] : affect appropriate [de-identified] : Normal respirations [de-identified] : Soft, nontender, nondistended.  Incisions well-healed.

## 2022-10-03 NOTE — ASSESSMENT
[FreeTextEntry1] : 47-year-old male recovering uneventfully status post laparoscopic sleeve gastrectomy 9/21/2022.

## 2022-10-06 DIAGNOSIS — U07.1 COVID-19: ICD-10-CM

## 2022-10-17 ENCOUNTER — APPOINTMENT (OUTPATIENT)
Dept: SURGERY | Facility: CLINIC | Age: 47
End: 2022-10-17

## 2022-10-17 VITALS
RESPIRATION RATE: 18 BRPM | WEIGHT: 261.1 LBS | OXYGEN SATURATION: 98 % | DIASTOLIC BLOOD PRESSURE: 82 MMHG | HEIGHT: 70 IN | BODY MASS INDEX: 37.38 KG/M2 | HEART RATE: 69 BPM | SYSTOLIC BLOOD PRESSURE: 122 MMHG | TEMPERATURE: 97.3 F

## 2022-10-17 PROCEDURE — 99024 POSTOP FOLLOW-UP VISIT: CPT

## 2022-10-17 RX ORDER — SODIUM SULFATE, POTASSIUM SULFATE, MAGNESIUM SULFATE 17.5; 3.13; 1.6 G/ML; G/ML; G/ML
17.5-3.13-1.6 SOLUTION, CONCENTRATE ORAL TWICE DAILY
Qty: 2 | Refills: 0 | Status: DISCONTINUED | COMMUNITY
Start: 2022-06-17 | End: 2022-10-17

## 2022-10-17 RX ORDER — NIRMATRELVIR AND RITONAVIR 300-100 MG
20 X 150 MG & KIT ORAL
Qty: 1 | Refills: 0 | Status: DISCONTINUED | COMMUNITY
Start: 2022-10-06 | End: 2022-10-17

## 2022-10-17 NOTE — HISTORY OF PRESENT ILLNESS
[de-identified] : Balta is a 46 y/o male being seen for two week follow up post-op.\par He continues to do well, tolerating bariatric stage III diet without dysphagia or reflux.  Normal bowel movements.  No abdominal pain or fevers.

## 2022-10-17 NOTE — PHYSICAL EXAM
[Obese, well nourished, in no acute distress] : obese, well nourished, in no acute distress [Normal] : affect appropriate [de-identified] : Normal respirations [de-identified] : Soft, nontender, nondistended.  Incisions well-healed.

## 2022-12-19 ENCOUNTER — APPOINTMENT (OUTPATIENT)
Dept: SURGERY | Facility: CLINIC | Age: 47
End: 2022-12-19

## 2023-02-03 ENCOUNTER — APPOINTMENT (OUTPATIENT)
Dept: SURGERY | Facility: CLINIC | Age: 48
End: 2023-02-03
Payer: COMMERCIAL

## 2023-02-03 VITALS
OXYGEN SATURATION: 95 % | HEART RATE: 72 BPM | RESPIRATION RATE: 17 BRPM | DIASTOLIC BLOOD PRESSURE: 78 MMHG | TEMPERATURE: 96 F | HEIGHT: 70 IN | WEIGHT: 241.31 LBS | SYSTOLIC BLOOD PRESSURE: 119 MMHG | BODY MASS INDEX: 34.55 KG/M2

## 2023-02-03 PROCEDURE — 99213 OFFICE O/P EST LOW 20 MIN: CPT

## 2023-02-03 NOTE — HISTORY OF PRESENT ILLNESS
[de-identified] : Balta is a 48 year old male here for a 4.5 month post operative visit. S/P laparoscopic sleeve gastrectomy, laparoscopic lysis of adhesions, and laparoscopic repair of ventral incisional hernia on 9/21/22.\par \par He continues to do well, with excellent weight loss, and is maintaining a healthful diet.  He does not exercise regularly.  He reports no pain, dysphagia, or reflux.

## 2023-02-03 NOTE — PHYSICAL EXAM
[Normal] : affect appropriate [de-identified] : Normal respirations [de-identified] : Soft, nontender, nondistended.  Incisions well-healed.

## 2023-02-03 NOTE — ASSESSMENT
[FreeTextEntry1] : 48-year-old male doing well status post laparoscopic sleeve gastrectomy 9/21/2022.\par

## 2023-02-03 NOTE — PLAN
[FreeTextEntry1] : []  labs ordered\par [] cont bariatric diet and consult with nutritionist as needed\par [] goal 30 min cardiovascular exercise daily, with some weight resistance training as tolerated\par [] continue multivitamins\par [] f/u 3 months

## 2023-02-28 NOTE — REASON FOR VISIT
[FreeTextEntry1] : Pre operative endoscopic  assessment prior to bariatric surgery Plastic Surgeon Procedure Text (A): After obtaining clear surgical margins the patient was sent to plastics for surgical repair.  The patient understands they will receive post-surgical care and follow-up from the referring physician's office.

## 2023-03-27 NOTE — ED ADULT TRIAGE NOTE - AS TEMP SITE
CHIEF COMPLAINT:    Mr. Colin is a 60 y.o. male presenting for a consultation at the request of Dr. Weber. Patient presents with LUTS.    PRESENTING ILLNESS:    Nanda Colin III is a 60 y.o. male who c/o LUTS.  He has nocturia x 2.  + decreased FOS, + straining, + hesitancy. He's on flomax which initially worked well, but his LUTS worsened.   Was then started on finasteride.  He follows up on finasteride and is pleased with how he's voiding.  Nocturia is now x 1.      He has ED.  Gets good results with Viagra.    REVIEW OF SYSTEMS:    Nanda Colin III denies headache, blurred vision, fever, nausea, vomiting, chills, abdominal pain, chest pain, sore throat, bleeding per rectum, cough, SOB, recent loss of consciousness, recent mental status changes, seizures, dizziness, or upper or lower extremity weakness.    ERIK  1. 2  2. 2  3. 2  4. 2  5. 2      PATIENT HISTORY:    Past Medical History:   Diagnosis Date    BPH (benign prostatic hypertrophy)     Family history of prostate cancer 8/5/2021    brother    Hernia     Hyperlipidemia     Hypertension, essential 12/30/2015    Incarcerated umbilical hernia 6/25/2016    Umbilical hernia        Past Surgical History:   Procedure Laterality Date    ANGIOGRAPHY OF LOWER EXTREMITY Bilateral 11/9/2022    Procedure: Angiogram Extremity Unilateral;  Surgeon: Ruperto Bay MD;  Location: [address];  Service: Vascular;  Laterality: Bilateral;  3.7 min  1067.83 mGy  125.26 Gy.cm  104ml Dye    AORTOGRAPHY N/A 11/9/2022    Procedure: AORTOGRAM;  Surgeon: Ruperto Bay MD;  Location: [address];  Service: Vascular;  Laterality: N/A;    AORTOGRAPHY WITH SERIALOGRAPHY N/A 3/15/2022    Procedure: AORTOGRAM, WITH SERIALOGRAPHY;  Surgeon: Ruperto Bay MD;  Location: [address];  Service: Vascular;  Laterality: N/A;  aortogram w/ runoff & PTA,    COLONOSCOPY N/A 1/14/2019    Procedure: COLONOSCOPY;  Surgeon: Fritz Franco MD;  Location: [facility] ([facility]  FLR);  Service: Endoscopy;  Laterality: N/A;  Do not cancel this order    COLONOSCOPY N/A 2023    Procedure: COLONOSCOPY;  Surgeon: Ja Alexander MD;  Location: [facility] ENDO (4TH FLR);  Service: Endoscopy;  Laterality: N/A;  inst portal-any md-tb-ok to hold plavix see te 22  Precall done -AA    HERNIA REPAIR      TRANSFORAMINAL EPIDURAL INJECTION OF STEROID Right 12/15/2021    Procedure: Injection,steroid,epidural,transforaminal approach Right L4 & L5 (May change depending MRI);  Surgeon: Stacey Mota MD;  Location: [facility] PAIN T;  Service: Pain Management;  Laterality: Right;    VASECTOMY      WISDOM TOOTH EXTRACTION         Family History   Problem Relation Age of Onset    Stroke Father     Heart disease Paternal Uncle     Kidney disease Paternal Uncle     Depression Paternal Uncle     Cancer Maternal Grandfather     COPD Maternal Grandfather     Cancer Paternal Grandmother     COPD Paternal Aunt        Social History     Socioeconomic History    Marital status:      Spouse name: Lakia    Number of children: 2   Occupational History    Occupation: superintendent     Comment:  [employer]     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       Allergies:  Adhesive    Medications:    Current Outpatient Medications:     aspirin (ECOTRIN) 81 MG EC tablet, Take 81 mg by mouth., Disp: , Rfl:     clopidogreL (PLAVIX) 75 mg tablet, Take 1 tablet (75 mg total) by mouth once daily., Disp: 90 tablet, Rfl: 3    clotrimazole (LOTRIMIN) 1 % cream, Apply topically 2 (two) times daily., Disp: 60 g, Rfl: 0     finasteride (PROSCAR) 5 mg tablet, Take 1 tablet (5 mg total) by mouth once daily., Disp: 30 tablet, Rfl: 11    losartan (COZAAR) 25 MG tablet, Take 1 tablet (25 mg total) by mouth once daily., Disp: 90 tablet, Rfl: 3    rosuvastatin (CRESTOR) 40 MG Tab, Take 1 tablet (40 mg total) by mouth once daily., Disp: 90 tablet, Rfl: 1    sildenafiL (VIAGRA) 100 MG tablet, Take 1 tablet (100 mg total) by mouth daily as needed for Erectile Dysfunction., Disp: 30 tablet, Rfl: 2    tamsulosin (FLOMAX) 0.4 mg Cap, Take 1 capsule (0.4 mg total) by mouth once daily., Disp: 90 capsule, Rfl: 3    PHYSICAL EXAMINATION:    The patient generally appears in good health, is appropriately interactive, and is in no apparent distress.     Eyes: anicteric sclerae, moist conjunctivae; no lid-lag; PERRLA     HENT: Atraumatic; oropharynx clear with moist mucous membranes and no mucosal ulcerations;normal hard and soft palate.  No evidence of lymphadenopathy.    Neck: Trachea midline.  No thyromegaly.    Skin: No lesions.    Mental: Cooperative with normal affect.  Is oriented to time, place, and person.    Neuro: Grossly intact.    Chest: Normal inspiratory effort.   No accessory muscles.  No audible wheezes.  Respirations symmetric on inspiration and expiration.    Heart: Regular rhythm.      Abdomen:  Soft, non-tender. No masses or organomegaly. Bladder is not palpable. No evidence of flank discomfort. No evidence of inguinal hernia.    Genitourinary: The penis is circumcised with no evidence of plaques or induration. The urethral meatus is normal. The testes, epididymides, and cord structures are normal in size and contour bilaterally. The scrotum is normal in size and contour.    Normal anal sphincter tone. No rectal mass.    The prostate is 40 g. Normal landmarks. Lateral sulci. Median furrow intact.  No nodularity or induration. Seminal vesicles are normal.    Extremities: No clubbing, cyanosis, or edema      LABS:      Lab Results    Component Value Date    PSA 2.3 10/10/2022    PSA 2.5 08/03/2021    PSA 2.4 07/14/2020       IMPRESSION:    Encounter Diagnoses   Name Primary?    BPH with urinary obstruction Yes    Erectile dysfunction due to arterial insufficiency     Hypertension, essential     Other hyperlipidemia    HTN, stable  Hyperlipidemia, stable      PLAN:    1. Discussed options for his LUTS.  He's not interested in rezum at this time. Continue finasteride.  Continue flomax.  Refilled.  2. Discussed options for his ED.  Continue Viagra.  Refilled.  3. RTC 1 year to see an DAKSHA.    Copy to: Tia         oral

## 2023-04-13 ENCOUNTER — NON-APPOINTMENT (OUTPATIENT)
Age: 48
End: 2023-04-13

## 2023-04-13 ENCOUNTER — APPOINTMENT (OUTPATIENT)
Dept: CARDIOLOGY | Facility: CLINIC | Age: 48
End: 2023-04-13
Payer: COMMERCIAL

## 2023-04-13 VITALS
TEMPERATURE: 98 F | SYSTOLIC BLOOD PRESSURE: 120 MMHG | DIASTOLIC BLOOD PRESSURE: 70 MMHG | WEIGHT: 243 LBS | BODY MASS INDEX: 34.79 KG/M2 | HEART RATE: 57 BPM | HEIGHT: 70 IN | OXYGEN SATURATION: 97 %

## 2023-04-13 DIAGNOSIS — R93.89 ABNORMAL FINDINGS ON DIAGNOSTIC IMAGING OF OTHER SPECIFIED BODY STRUCTURES: ICD-10-CM

## 2023-04-13 DIAGNOSIS — R80.9 PROTEINURIA, UNSPECIFIED: ICD-10-CM

## 2023-04-13 DIAGNOSIS — F17.200 NICOTINE DEPENDENCE, UNSPECIFIED, UNCOMPLICATED: ICD-10-CM

## 2023-04-13 DIAGNOSIS — I10 ESSENTIAL (PRIMARY) HYPERTENSION: ICD-10-CM

## 2023-04-13 DIAGNOSIS — E66.9 OBESITY, UNSPECIFIED: ICD-10-CM

## 2023-04-13 PROCEDURE — 99214 OFFICE O/P EST MOD 30 MIN: CPT | Mod: 25

## 2023-04-13 PROCEDURE — 93000 ELECTROCARDIOGRAM COMPLETE: CPT

## 2023-04-13 RX ORDER — AMLODIPINE BESYLATE 5 MG/1
5 TABLET ORAL DAILY
Qty: 30 | Refills: 0 | Status: DISCONTINUED | COMMUNITY
Start: 2022-09-15 | End: 2023-04-13

## 2023-04-17 LAB
ALBUMIN SERPL ELPH-MCNC: 4.4 G/DL
ALP BLD-CCNC: 66 U/L
ALT SERPL-CCNC: 13 U/L
ANION GAP SERPL CALC-SCNC: 15 MMOL/L
APPEARANCE: CLEAR
AST SERPL-CCNC: 14 U/L
BACTERIA: NEGATIVE
BILIRUB DIRECT SERPL-MCNC: 0.2 MG/DL
BILIRUB INDIRECT SERPL-MCNC: 0.5 MG/DL
BILIRUB SERPL-MCNC: 0.6 MG/DL
BILIRUBIN URINE: NEGATIVE
BLOOD URINE: NEGATIVE
BUN SERPL-MCNC: 15 MG/DL
CALCIUM SERPL-MCNC: 9.4 MG/DL
CALCIUM SERPL-MCNC: 9.8 MG/DL
CHLORIDE SERPL-SCNC: 100 MMOL/L
CHOLEST SERPL-MCNC: 159 MG/DL
CK SERPL-CCNC: 79 U/L
CO2 SERPL-SCNC: 27 MMOL/L
COLOR: YELLOW
CREAT SERPL-MCNC: 1.12 MG/DL
CREAT SPEC-SCNC: 146 MG/DL
EGFR: 82 ML/MIN/1.73M2
ESTIMATED AVERAGE GLUCOSE: 91 MG/DL
GLUCOSE QUALITATIVE U: NEGATIVE
GLUCOSE SERPL-MCNC: 87 MG/DL
HBA1C MFR BLD HPLC: 4.8 %
HDLC SERPL-MCNC: 57 MG/DL
HYALINE CASTS: 1 /LPF
IRON SERPL-MCNC: 141 UG/DL
KETONES URINE: NEGATIVE
LDLC SERPL CALC-MCNC: 81 MG/DL
LDLC SERPL DIRECT ASSAY-MCNC: 82 MG/DL
LEUKOCYTE ESTERASE URINE: NEGATIVE
MAGNESIUM SERPL-MCNC: 2.4 MG/DL
MICROALBUMIN 24H UR DL<=1MG/L-MCNC: 3 MG/DL
MICROALBUMIN/CREAT 24H UR-RTO: 20 MG/G
MICROSCOPIC-UA: NORMAL
NITRITE URINE: NEGATIVE
NONHDLC SERPL-MCNC: 102 MG/DL
PARATHYROID HORMONE INTACT: 47 PG/ML
PH URINE: 6.5
PHOSPHATE SERPL-MCNC: 3 MG/DL
POTASSIUM SERPL-SCNC: 3.5 MMOL/L
PROT SERPL-MCNC: 7.3 G/DL
PROTEIN URINE: NORMAL
RED BLOOD CELLS URINE: 1 /HPF
SODIUM SERPL-SCNC: 141 MMOL/L
SPECIFIC GRAVITY URINE: 1.01
SQUAMOUS EPITHELIAL CELLS: 0 /HPF
T4 FREE SERPL-MCNC: 1.4 NG/DL
TRANSFERRIN SERPL-MCNC: 216 MG/DL
TRIGL SERPL-MCNC: 104 MG/DL
TSH SERPL-ACNC: 1.22 UIU/ML
UROBILINOGEN URINE: NORMAL
WHITE BLOOD CELLS URINE: 1 /HPF

## 2023-04-18 NOTE — CARDIOLOGY SUMMARY
[de-identified] : 10/27/2021 LVEF 62%, moderate concentric LVH, moderately enlarged LAE, trace MR, trivial TR, trace IA. [de-identified] : 10/15/2021 CT Heart Calcium score 15, 2-3 mm triangular shaped BALDO nodules. Likely lymph nodes.

## 2023-04-18 NOTE — HISTORY OF PRESENT ILLNESS
[FreeTextEntry1] : Balta is a 48 y.o male w/MHx of Agatston 15, HLD, HTN, Obesity who presents for routine follow up. 9/21/2022 s/p LSG w/ Dr. Chow, uneventful per pt. Doing well. Did note for work clearance, told had proteinuria.\par The patient is here for follow-up of elevated cholesterol. The patient is trying to follow a low cholesterol diet.\par The patient is here for evaluation of high blood pressure. Currently tolerating antihypertensive medications. Denies headaches, stiff neck, visual changes, or PND. The patient has been trying to stay on a low-sodium diet.\par Follow-up weight reduction. States that there has been weight reduction. Patient currently overweight and remains somewhat sedentary. BMI 34.87.

## 2023-04-19 ENCOUNTER — OUTPATIENT (OUTPATIENT)
Dept: OUTPATIENT SERVICES | Facility: HOSPITAL | Age: 48
LOS: 1 days | End: 2023-04-19
Payer: COMMERCIAL

## 2023-04-19 ENCOUNTER — APPOINTMENT (OUTPATIENT)
Dept: CT IMAGING | Facility: CLINIC | Age: 48
End: 2023-04-19
Payer: COMMERCIAL

## 2023-04-19 DIAGNOSIS — T14.8XXA OTHER INJURY OF UNSPECIFIED BODY REGION, INITIAL ENCOUNTER: Chronic | ICD-10-CM

## 2023-04-19 DIAGNOSIS — Z90.49 ACQUIRED ABSENCE OF OTHER SPECIFIED PARTS OF DIGESTIVE TRACT: Chronic | ICD-10-CM

## 2023-04-19 DIAGNOSIS — S43.006A UNSPECIFIED DISLOCATION OF UNSPECIFIED SHOULDER JOINT, INITIAL ENCOUNTER: Chronic | ICD-10-CM

## 2023-04-19 DIAGNOSIS — Z00.00 ENCOUNTER FOR GENERAL ADULT MEDICAL EXAMINATION WITHOUT ABNORMAL FINDINGS: ICD-10-CM

## 2023-04-19 DIAGNOSIS — Z87.81 PERSONAL HISTORY OF (HEALED) TRAUMATIC FRACTURE: Chronic | ICD-10-CM

## 2023-04-19 PROCEDURE — 71250 CT THORAX DX C-: CPT | Mod: 26

## 2023-04-19 PROCEDURE — 71250 CT THORAX DX C-: CPT

## 2023-04-27 ENCOUNTER — RX RENEWAL (OUTPATIENT)
Age: 48
End: 2023-04-27

## 2023-05-03 ENCOUNTER — NON-APPOINTMENT (OUTPATIENT)
Age: 48
End: 2023-05-03

## 2023-05-08 ENCOUNTER — APPOINTMENT (OUTPATIENT)
Dept: SURGERY | Facility: CLINIC | Age: 48
End: 2023-05-08
Payer: COMMERCIAL

## 2023-05-15 ENCOUNTER — APPOINTMENT (OUTPATIENT)
Dept: SURGERY | Facility: CLINIC | Age: 48
End: 2023-05-15
Payer: COMMERCIAL

## 2023-05-19 ENCOUNTER — APPOINTMENT (OUTPATIENT)
Dept: SURGERY | Facility: CLINIC | Age: 48
End: 2023-05-19
Payer: COMMERCIAL

## 2023-05-19 VITALS
HEART RATE: 60 BPM | WEIGHT: 237 LBS | BODY MASS INDEX: 33.93 KG/M2 | HEIGHT: 70 IN | SYSTOLIC BLOOD PRESSURE: 126 MMHG | TEMPERATURE: 97.1 F | OXYGEN SATURATION: 97 % | DIASTOLIC BLOOD PRESSURE: 82 MMHG

## 2023-05-19 VITALS
DIASTOLIC BLOOD PRESSURE: 78 MMHG | RESPIRATION RATE: 18 BRPM | TEMPERATURE: 98.7 F | OXYGEN SATURATION: 98 % | WEIGHT: 259.31 LBS | HEART RATE: 73 BPM | SYSTOLIC BLOOD PRESSURE: 119 MMHG | BODY MASS INDEX: 38.41 KG/M2 | HEIGHT: 69 IN

## 2023-05-19 DIAGNOSIS — E44.1 MILD PROTEIN-CALORIE MALNUTRITION: ICD-10-CM

## 2023-05-19 PROCEDURE — 99213 OFFICE O/P EST LOW 20 MIN: CPT

## 2023-05-19 NOTE — PLAN
[FreeTextEntry1] : [] cont bariatric diet and consult with nutritionist as needed\par [] goal 30 min cardiovascular exercise daily, with some weight resistance training as tolerated\par [] continue multivitamins\par [] f/u 3 months

## 2023-05-19 NOTE — HISTORY OF PRESENT ILLNESS
[de-identified] : Balta is a 48 year old male here for a 7.5 month post operative visit. S/P laparoscopic sleeve gastrectomy, laparoscopic lysis of adhesions, and laparoscopic repair of ventral incisional hernia on 9/21/22.\par He continues to do very well, with good weight loss.  He is maintaining a healthy diet and being careful not to overeat.  The only change he would like to make is to exercise more regularly.

## 2023-05-19 NOTE — ASSESSMENT
[FreeTextEntry1] : 48-year-old male doing well status post laparoscopic sleeve gastrectomy 9/21/2022.\par Preoperative weight 296 pounds, BMI 42\par Current weight 237 pounds, BMI 34

## 2023-06-15 NOTE — PHARMACOTHERAPY INTERVENTION NOTE - REASON FOR NOTE
Radiology Post-Procedure Note    Pre Op Diagnosis: Ascites  Post Op Diagnosis: Same    Procedure: Paracentesis    Procedure performed by: Brianne Song MD PhD                                           Ruby Roman PA-C    Written Informed Consent Obtained: yes  Specimen Removed: yes  Estimated Blood Loss: Minimal    Findings:   Successful paracentesis.  Albumin administered PRN per protocol.    Patient tolerated procedure well.      Brianne Song MD PhD  Interventional Radiology  PGY-2    Weight Loss Surgery - Medications

## 2023-08-18 ENCOUNTER — APPOINTMENT (OUTPATIENT)
Dept: SURGERY | Facility: CLINIC | Age: 48
End: 2023-08-18
Payer: COMMERCIAL

## 2023-09-08 ENCOUNTER — APPOINTMENT (OUTPATIENT)
Dept: SURGERY | Facility: CLINIC | Age: 48
End: 2023-09-08
Payer: COMMERCIAL

## 2023-09-08 VITALS
HEART RATE: 59 BPM | WEIGHT: 241.31 LBS | DIASTOLIC BLOOD PRESSURE: 127 MMHG | BODY MASS INDEX: 34.55 KG/M2 | TEMPERATURE: 97 F | SYSTOLIC BLOOD PRESSURE: 178 MMHG | RESPIRATION RATE: 17 BRPM | OXYGEN SATURATION: 99 % | HEIGHT: 70 IN

## 2023-09-08 PROCEDURE — 99214 OFFICE O/P EST MOD 30 MIN: CPT

## 2023-09-08 RX ORDER — PEN NEEDLE, DIABETIC 32 GX 1/4"
32G X 6 MM NEEDLE, DISPOSABLE MISCELLANEOUS
Qty: 100 | Refills: 0 | Status: ACTIVE | COMMUNITY
Start: 2023-09-08 | End: 1900-01-01

## 2023-09-08 NOTE — PHYSICAL EXAM
[Normal] : affect appropriate [de-identified] : Normal respirations [de-identified] : Soft, nontender, nondistended.  Incisions well-healed.

## 2023-09-08 NOTE — ASSESSMENT
[FreeTextEntry1] : 48-year-old male status post laparoscopic sleeve gastrectomy 9/21/2022. Preoperative weight 296 pounds, BMI 42 Current weight 241 pounds, BMI 35

## 2023-09-08 NOTE — HISTORY OF PRESENT ILLNESS
[de-identified] : Balta is a 48 year old male here for his 1 year post operative visit. S/P laparoscopic sleeve gastrectomy, laparoscopic lysis of adhesions, and laparoscopic repair of ventral incisional hernia on 9/21/22.  He has had total weight loss of approximately 60 pounds.  He feels good, is eating healthfully, and is happy with his weight loss, however has hit a plateau.  He admits he could exercise more, however he feels that working night shifts continues to negatively impact his metabolism and has impeded his further weight loss.  He is motivated to exercise more, however is interested in medical weight management.  He reports normal bowel function.  He has had no dysphagia or reflux.

## 2023-09-08 NOTE — PLAN
[FreeTextEntry1] : [] nutritional labs ordered [] cont bariatric diet and consult with nutritionist as needed [] goal 30 min cardiovascular exercise daily, with some weight resistance training as tolerated [] continue multivitamins [] Discussed possibility of starting Saxenda, which he is interested in, and will initiate authorization process [] f/u 2 weeks

## 2023-09-14 ENCOUNTER — RX RENEWAL (OUTPATIENT)
Age: 48
End: 2023-09-14

## 2023-09-14 RX ORDER — HYDRALAZINE HYDROCHLORIDE 100 MG/1
100 TABLET ORAL
Qty: 180 | Refills: 1 | Status: ACTIVE | COMMUNITY
Start: 2020-04-23 | End: 1900-01-01

## 2023-09-14 RX ORDER — LABETALOL HYDROCHLORIDE 100 MG/1
100 TABLET, FILM COATED ORAL
Qty: 180 | Refills: 1 | Status: ACTIVE | COMMUNITY
Start: 2020-04-23 | End: 1900-01-01

## 2023-09-14 RX ORDER — LIRAGLUTIDE 6 MG/ML
18 INJECTION, SOLUTION SUBCUTANEOUS
Qty: 15 | Refills: 1 | Status: ACTIVE | COMMUNITY
Start: 2023-09-08 | End: 1900-01-01

## 2023-09-28 ENCOUNTER — APPOINTMENT (OUTPATIENT)
Dept: SURGERY | Facility: CLINIC | Age: 48
End: 2023-09-28

## 2023-10-12 ENCOUNTER — APPOINTMENT (OUTPATIENT)
Dept: CARDIOLOGY | Facility: CLINIC | Age: 48
End: 2023-10-12

## 2023-11-21 ENCOUNTER — APPOINTMENT (OUTPATIENT)
Dept: SURGERY | Facility: CLINIC | Age: 48
End: 2023-11-21

## 2023-11-22 ENCOUNTER — APPOINTMENT (OUTPATIENT)
Dept: CARDIOLOGY | Facility: CLINIC | Age: 48
End: 2023-11-22

## 2023-12-21 ENCOUNTER — APPOINTMENT (OUTPATIENT)
Dept: INTERNAL MEDICINE | Facility: CLINIC | Age: 48
End: 2023-12-21
Payer: COMMERCIAL

## 2023-12-21 VITALS
HEIGHT: 70 IN | TEMPERATURE: 97.9 F | WEIGHT: 251 LBS | BODY MASS INDEX: 35.93 KG/M2 | OXYGEN SATURATION: 98 % | DIASTOLIC BLOOD PRESSURE: 80 MMHG | HEART RATE: 66 BPM | SYSTOLIC BLOOD PRESSURE: 110 MMHG

## 2023-12-21 DIAGNOSIS — I10 ESSENTIAL (PRIMARY) HYPERTENSION: ICD-10-CM

## 2023-12-21 DIAGNOSIS — E66.01 MORBID (SEVERE) OBESITY DUE TO EXCESS CALORIES: ICD-10-CM

## 2023-12-21 DIAGNOSIS — Z87.19 PERSONAL HISTORY OF OTHER DISEASES OF THE DIGESTIVE SYSTEM: ICD-10-CM

## 2023-12-21 DIAGNOSIS — F17.200 NICOTINE DEPENDENCE, UNSPECIFIED, UNCOMPLICATED: ICD-10-CM

## 2023-12-21 DIAGNOSIS — Z20.1 CONTACT WITH AND (SUSPECTED) EXPOSURE TO TUBERCULOSIS: ICD-10-CM

## 2023-12-21 PROCEDURE — 99204 OFFICE O/P NEW MOD 45 MIN: CPT

## 2023-12-21 NOTE — HEALTH RISK ASSESSMENT
[Yes] : Yes [Monthly or less (1 pt)] : Monthly or less (1 point) [1 or 2 (0 pts)] : 1 or 2 (0 points) [Never (0 pts)] : Never (0 points) [No falls in past year] : Patient reported no falls in the past year [0] : 2) Feeling down, depressed, or hopeless: Not at all (0) [PHQ-2 Negative - No further assessment needed] : PHQ-2 Negative - No further assessment needed [Current] : Current

## 2023-12-21 NOTE — REVIEW OF SYSTEMS
[Fever] : no fever [Chills] : no chills [Discharge] : no discharge [Vision Problems] : no vision problems [Earache] : no earache [Nasal Discharge] : no nasal discharge [Chest Pain] : no chest pain [Orthopena] : no orthopnea [Shortness Of Breath] : no shortness of breath [Abdominal Pain] : no abdominal pain [Vomiting] : no vomiting [Dysuria] : no dysuria [Hematuria] : no hematuria [Joint Pain] : no joint pain [Back Pain] : no back pain [Itching] : no itching [Skin Rash] : no skin rash [Headache] : no headache [Memory Loss] : no memory loss

## 2023-12-21 NOTE — HISTORY OF PRESENT ILLNESS
[FreeTextEntry8] : 48 year old male here for an episodic visit. Patient was exposed to a family member who is hospitalized with tuberculosis.  Last exposure was about 2 weeks ago.  He went to [facility] urgent care recently, had negative CXR and a positive PPD and was advised to f/u with his PCP.  Currently he denies any symptoms such as chest pain, cough, shortness of breath, weight loss or hemoptysis.

## 2023-12-21 NOTE — PHYSICAL EXAM
[No Acute Distress] : no acute distress [Normal Sclera/Conjunctiva] : normal sclera/conjunctiva [Normal Outer Ear/Nose] : the outer ears and nose were normal in appearance [No JVD] : no jugular venous distention [No Respiratory Distress] : no respiratory distress  [No Accessory Muscle Use] : no accessory muscle use [Clear to Auscultation] : lungs were clear to auscultation bilaterally [Normal Rate] : normal rate  [Regular Rhythm] : with a regular rhythm [Normal S1, S2] : normal S1 and S2 [No Edema] : there was no peripheral edema [Soft] : abdomen soft [No Joint Swelling] : no joint swelling [Grossly Normal Strength/Tone] : grossly normal strength/tone [No Rash] : no rash

## 2023-12-21 NOTE — PLAN
[FreeTextEntry1] : Recent exposure to Mycobacterium Tuberculosis - Most recent exposure was 2 weeks ago, pt is currently asymptomatic, reports PPD was positive but CXR was negative.  Will check routine labs including quantiferon/HIV and refer to Infectious disease. HTN - controlled, c/w same HLD - not taking statin consistently, previous lipid panel was stable, will repeat labs today Tobacco use disorder - advised cessation

## 2023-12-22 ENCOUNTER — APPOINTMENT (OUTPATIENT)
Dept: CARDIOLOGY | Facility: CLINIC | Age: 48
End: 2023-12-22

## 2023-12-26 LAB
25(OH)D3 SERPL-MCNC: 24.8 NG/ML
ALBUMIN SERPL ELPH-MCNC: 4.4 G/DL
ALP BLD-CCNC: 57 U/L
ALT SERPL-CCNC: 15 U/L
ANION GAP SERPL CALC-SCNC: 13 MMOL/L
APPEARANCE: CLEAR
AST SERPL-CCNC: 14 U/L
BACTERIA: NEGATIVE /HPF
BILIRUB DIRECT SERPL-MCNC: 0.1 MG/DL
BILIRUB INDIRECT SERPL-MCNC: 0.3 MG/DL
BILIRUB SERPL-MCNC: 0.4 MG/DL
BILIRUBIN URINE: NEGATIVE
BLOOD URINE: NEGATIVE
BUN SERPL-MCNC: 20 MG/DL
CALCIUM SERPL-MCNC: 9.6 MG/DL
CAST: 1 /LPF
CHLORIDE SERPL-SCNC: 101 MMOL/L
CHOLEST SERPL-MCNC: 169 MG/DL
CO2 SERPL-SCNC: 26 MMOL/L
COLOR: YELLOW
CREAT SERPL-MCNC: 1.24 MG/DL
EGFR: 72 ML/MIN/1.73M2
EPITHELIAL CELLS: 1 /HPF
ESTIMATED AVERAGE GLUCOSE: 94 MG/DL
GLUCOSE QUALITATIVE U: NEGATIVE MG/DL
GLUCOSE SERPL-MCNC: 82 MG/DL
HBA1C MFR BLD HPLC: 4.9 %
HCT VFR BLD CALC: 41.8 %
HDLC SERPL-MCNC: 45 MG/DL
HGB BLD-MCNC: 14.3 G/DL
HIV1+2 AB SPEC QL IA.RAPID: NONREACTIVE
INFLUENZA A RESULT: NOT DETECTED
INFLUENZA B RESULT: NOT DETECTED
KETONES URINE: NEGATIVE MG/DL
LDLC SERPL CALC-MCNC: 102 MG/DL
LEUKOCYTE ESTERASE URINE: NEGATIVE
M TB IFN-G BLD-IMP: POSITIVE
MCHC RBC-ENTMCNC: 32.2 PG
MCHC RBC-ENTMCNC: 34.2 GM/DL
MCV RBC AUTO: 94.1 FL
MICROSCOPIC-UA: NORMAL
NITRITE URINE: NEGATIVE
NONHDLC SERPL-MCNC: 125 MG/DL
PH URINE: 6
PLATELET # BLD AUTO: 282 K/UL
POTASSIUM SERPL-SCNC: 4.1 MMOL/L
PROT SERPL-MCNC: 7.5 G/DL
PROTEIN URINE: NORMAL MG/DL
QUANTIFERON TB PLUS MITOGEN MINUS NIL: >10 IU/ML
QUANTIFERON TB PLUS NIL: 0.04 IU/ML
QUANTIFERON TB PLUS TB1 MINUS NIL: 0.86 IU/ML
QUANTIFERON TB PLUS TB2 MINUS NIL: 0.93 IU/ML
RBC # BLD: 4.44 M/UL
RBC # FLD: 14.4 %
RED BLOOD CELLS URINE: 0 /HPF
RESP SYN VIRUS RESULT: NOT DETECTED
SARS-COV-2 RESULT: NOT DETECTED
SODIUM SERPL-SCNC: 140 MMOL/L
SPECIFIC GRAVITY URINE: 1.02
TRIGL SERPL-MCNC: 124 MG/DL
TSH SERPL-ACNC: 1.74 UIU/ML
UROBILINOGEN URINE: 0.2 MG/DL
VIT B12 SERPL-MCNC: 522 PG/ML
WBC # FLD AUTO: 9.63 K/UL
WHITE BLOOD CELLS URINE: 1 /HPF

## 2024-01-02 ENCOUNTER — APPOINTMENT (OUTPATIENT)
Dept: INFECTIOUS DISEASE | Facility: CLINIC | Age: 49
End: 2024-01-02
Payer: COMMERCIAL

## 2024-01-02 ENCOUNTER — NON-APPOINTMENT (OUTPATIENT)
Age: 49
End: 2024-01-02

## 2024-01-02 VITALS
BODY MASS INDEX: 35.5 KG/M2 | SYSTOLIC BLOOD PRESSURE: 112 MMHG | HEIGHT: 70 IN | WEIGHT: 248 LBS | TEMPERATURE: 97.4 F | HEART RATE: 81 BPM | DIASTOLIC BLOOD PRESSURE: 70 MMHG | OXYGEN SATURATION: 97 %

## 2024-01-02 DIAGNOSIS — Z22.7 LATENT TUBERCULOSIS: ICD-10-CM

## 2024-01-02 PROCEDURE — 99203 OFFICE O/P NEW LOW 30 MIN: CPT

## 2024-01-02 RX ORDER — ATORVASTATIN CALCIUM 10 MG/1
10 TABLET, FILM COATED ORAL DAILY
Qty: 90 | Refills: 1 | Status: DISCONTINUED | COMMUNITY
Start: 2022-02-04 | End: 2024-01-02

## 2024-01-02 RX ORDER — ISONIAZID 300 MG/1
300 TABLET ORAL WEEKLY
Qty: 12 | Refills: 2 | Status: ACTIVE | COMMUNITY
Start: 2024-01-02

## 2024-01-02 RX ORDER — RIFAPENTINE 150 MG/1
150 TABLET, FILM COATED ORAL WEEKLY
Qty: 24 | Refills: 2 | Status: ACTIVE | COMMUNITY
Start: 2024-01-02

## 2024-01-02 NOTE — HISTORY OF PRESENT ILLNESS
[FreeTextEntry1] : 48 M current smoker, PMH HTN, hyperlipidemia, presents to the ID clinic to establish care.  Found to have latent TB with + QuantiFERON and negative CXR per recent Medicine note   Presents here for latent TB treatment  Baseline LFTs wnl

## 2024-01-02 NOTE — PHYSICAL EXAM
[General Appearance - Alert] : alert [General Appearance - In No Acute Distress] : in no acute distress [Extraocular Movements] : extraocular movements were intact [Outer Ear] : the ears and nose were normal in appearance [Neck Appearance] : the appearance of the neck was normal [Auscultation Breath Sounds / Voice Sounds] : lungs were clear to auscultation bilaterally [Heart Rate And Rhythm] : heart rate was normal and rhythm regular [Edema] : there was no peripheral edema [Abdomen Soft] : soft [Musculoskeletal - Swelling] : no joint swelling [] : no rash [No Focal Deficits] : no focal deficits [Oriented To Time, Place, And Person] : oriented to person, place, and time

## 2024-01-02 NOTE — ASSESSMENT
[FreeTextEntry1] : 48 M current smoker, PMH HTN, hyperlipidemia, presents to the ID clinic to establish care.  Found to have latent TB with + QuantiFERON and negative CXR per recent Medicine note  No symptoms whatsoever Presents here for latent TB treatment Baseline LFTs wnl   #Latent TB Weekly INH and Rifapentine with daily B6 for 3 months CBC and CMP monthly x3  To call us if any s/e from medication including but not limited to nausea, vomiting, abd pain, jaundice, etc

## 2024-01-02 NOTE — END OF VISIT
[] : Fellow [FreeTextEntry3] : 48M presents with + QTF. Tested after recent exposure to family member in the hospital with active TB. Too recent and brief for reactive test. The index patient's brother was sick and  with a chronic respiratory disease a few years ago, no diagnosis of TB was discussed. [place] as a . No S/S of active TB, had CXR by PCP- negative. Will start RX for LTBI with INH/Rifapentine weekly x 12 + B6. Discussed adverese effects, avoidance of ETOH, LFT monitoring, will initiate RX.

## 2024-01-04 ENCOUNTER — APPOINTMENT (OUTPATIENT)
Dept: CARDIOLOGY | Facility: CLINIC | Age: 49
End: 2024-01-04
Payer: COMMERCIAL

## 2024-01-04 PROCEDURE — 93306 TTE W/DOPPLER COMPLETE: CPT

## 2024-01-12 ENCOUNTER — NON-APPOINTMENT (OUTPATIENT)
Age: 49
End: 2024-01-12

## 2024-01-12 NOTE — HISTORY OF PRESENT ILLNESS
[de-identified] : Balta is a 48 year old male here for his 1 year post operative visit. S/P laparoscopic sleeve gastrectomy, laparoscopic lysis of adhesions, and laparoscopic repair of ventral incisional hernia on 9/21/22.  At previous visit patient was started on Saxenda due to some slight weight regain.  Presents for Saxenda follow-up today.

## 2024-01-19 ENCOUNTER — NON-APPOINTMENT (OUTPATIENT)
Age: 49
End: 2024-01-19

## 2024-01-24 ENCOUNTER — RX RENEWAL (OUTPATIENT)
Age: 49
End: 2024-01-24

## 2024-01-24 ENCOUNTER — NON-APPOINTMENT (OUTPATIENT)
Age: 49
End: 2024-01-24

## 2024-01-24 RX ORDER — PYRIDOXINE HCL (VITAMIN B6) 50 MG
50 TABLET ORAL
Qty: 4 | Refills: 2 | Status: ACTIVE | COMMUNITY
Start: 2024-01-02

## 2024-01-29 ENCOUNTER — APPOINTMENT (OUTPATIENT)
Dept: SURGERY | Facility: CLINIC | Age: 49
End: 2024-01-29

## 2024-02-02 ENCOUNTER — NON-APPOINTMENT (OUTPATIENT)
Age: 49
End: 2024-02-02

## 2024-02-02 ENCOUNTER — APPOINTMENT (OUTPATIENT)
Dept: INFECTIOUS DISEASE | Facility: CLINIC | Age: 49
End: 2024-02-02

## 2024-02-02 DIAGNOSIS — E55.9 VITAMIN D DEFICIENCY, UNSPECIFIED: ICD-10-CM

## 2024-02-02 LAB
25(OH)D3 SERPL-MCNC: 20.4 NG/ML
ALBUMIN SERPL ELPH-MCNC: 4 G/DL
ALP BLD-CCNC: 61 U/L
ALT SERPL-CCNC: 15 U/L
ANION GAP SERPL CALC-SCNC: 13 MMOL/L
AST SERPL-CCNC: 13 U/L
BASOPHILS # BLD AUTO: 0.06 K/UL
BASOPHILS NFR BLD AUTO: 0.6 %
BILIRUB SERPL-MCNC: 0.5 MG/DL
BUN SERPL-MCNC: 28 MG/DL
CALCIUM SERPL-MCNC: 9.1 MG/DL
CHLORIDE SERPL-SCNC: 102 MMOL/L
CO2 SERPL-SCNC: 27 MMOL/L
CREAT SERPL-MCNC: 1.38 MG/DL
EGFR: 63 ML/MIN/1.73M2
EOSINOPHIL # BLD AUTO: 0.21 K/UL
EOSINOPHIL NFR BLD AUTO: 2.1 %
ESTIMATED AVERAGE GLUCOSE: 94 MG/DL
FERRITIN SERPL-MCNC: 229 NG/ML
FOLATE SERPL-MCNC: 6.1 NG/ML
GLUCOSE SERPL-MCNC: 85 MG/DL
HBA1C MFR BLD HPLC: 4.9 %
HCT VFR BLD CALC: 39.7 %
HGB BLD-MCNC: 13.6 G/DL
IMM GRANULOCYTES NFR BLD AUTO: 0.6 %
IRON SATN MFR SERPL: 36 %
IRON SERPL-MCNC: 86 UG/DL
LYMPHOCYTES # BLD AUTO: 2.89 K/UL
LYMPHOCYTES NFR BLD AUTO: 29 %
MAN DIFF?: NORMAL
MCHC RBC-ENTMCNC: 31.5 PG
MCHC RBC-ENTMCNC: 34.3 GM/DL
MCV RBC AUTO: 91.9 FL
MONOCYTES # BLD AUTO: 0.75 K/UL
MONOCYTES NFR BLD AUTO: 7.5 %
NEUTROPHILS # BLD AUTO: 6 K/UL
NEUTROPHILS NFR BLD AUTO: 60.2 %
PLATELET # BLD AUTO: 294 K/UL
POTASSIUM SERPL-SCNC: 3.2 MMOL/L
PROT SERPL-MCNC: 6.3 G/DL
RBC # BLD: 4.32 M/UL
RBC # FLD: 14 %
SODIUM SERPL-SCNC: 142 MMOL/L
TIBC SERPL-MCNC: 237 UG/DL
UIBC SERPL-MCNC: 151 UG/DL
VIT B12 SERPL-MCNC: 376 PG/ML
WBC # FLD AUTO: 9.97 K/UL

## 2024-02-02 RX ORDER — ERGOCALCIFEROL 1.25 MG/1
1.25 MG CAPSULE ORAL
Qty: 13 | Refills: 1 | Status: ACTIVE | COMMUNITY
Start: 2024-02-02 | End: 1900-01-01

## 2024-02-08 ENCOUNTER — NON-APPOINTMENT (OUTPATIENT)
Age: 49
End: 2024-02-08

## 2024-02-09 ENCOUNTER — APPOINTMENT (OUTPATIENT)
Dept: SURGERY | Facility: CLINIC | Age: 49
End: 2024-02-09

## 2024-02-11 LAB — VIT B1 SERPL-MCNC: 140.3 NMOL/L

## 2024-02-16 ENCOUNTER — NON-APPOINTMENT (OUTPATIENT)
Age: 49
End: 2024-02-16

## 2024-02-18 ENCOUNTER — RX RENEWAL (OUTPATIENT)
Age: 49
End: 2024-02-18

## 2024-02-18 RX ORDER — NIFEDIPINE 60 MG/1
60 TABLET, EXTENDED RELEASE ORAL
Qty: 90 | Refills: 1 | Status: ACTIVE | COMMUNITY
Start: 2020-04-23 | End: 1900-01-01

## 2024-02-23 ENCOUNTER — NON-APPOINTMENT (OUTPATIENT)
Age: 49
End: 2024-02-23

## 2024-02-26 ENCOUNTER — APPOINTMENT (OUTPATIENT)
Dept: INFECTIOUS DISEASE | Facility: CLINIC | Age: 49
End: 2024-02-26

## 2024-03-01 ENCOUNTER — NON-APPOINTMENT (OUTPATIENT)
Age: 49
End: 2024-03-01

## 2024-03-07 ENCOUNTER — NON-APPOINTMENT (OUTPATIENT)
Age: 49
End: 2024-03-07

## 2024-03-08 ENCOUNTER — APPOINTMENT (OUTPATIENT)
Dept: INFECTIOUS DISEASE | Facility: CLINIC | Age: 49
End: 2024-03-08

## 2024-03-08 ENCOUNTER — APPOINTMENT (OUTPATIENT)
Dept: SURGERY | Facility: CLINIC | Age: 49
End: 2024-03-08
Payer: COMMERCIAL

## 2024-03-08 VITALS
TEMPERATURE: 97.8 F | SYSTOLIC BLOOD PRESSURE: 138 MMHG | BODY MASS INDEX: 35.69 KG/M2 | HEART RATE: 67 BPM | WEIGHT: 249.31 LBS | RESPIRATION RATE: 17 BRPM | DIASTOLIC BLOOD PRESSURE: 84 MMHG | HEIGHT: 70 IN | OXYGEN SATURATION: 99 %

## 2024-03-08 DIAGNOSIS — Z98.84 BARIATRIC SURGERY STATUS: ICD-10-CM

## 2024-03-08 DIAGNOSIS — R63.5 ABNORMAL WEIGHT GAIN: ICD-10-CM

## 2024-03-08 PROCEDURE — 99213 OFFICE O/P EST LOW 20 MIN: CPT

## 2024-03-08 NOTE — PLAN
[FreeTextEntry1] : [] Reviewed appropriate bariatric diet, including focusing on lean proteins and vegetables, avoiding highly processed foods, and avoiding excess sugars. [] Encouraged patient to schedule appointment with bariatric dietitian [] goal 30 min cardiovascular exercise daily, with some weight resistance training as tolerated [] continue multivitamins [] Patient to arrange consultation with Bob Thornton NP to discuss medical weight management [] f/u 3 months

## 2024-03-08 NOTE — ASSESSMENT
[FreeTextEntry1] : 49-year-old male status post laparoscopic sleeve gastrectomy 9/21/2022. Preoperative weight 296 pounds, BMI 42 Current weight 249 Pounds, BMI 35

## 2024-03-08 NOTE — PHYSICAL EXAM
[Normal] : affect appropriate [de-identified] : Soft, nontender, nondistended.  Incisions well-healed. [de-identified] : Normal respirations

## 2024-03-08 NOTE — HISTORY OF PRESENT ILLNESS
[de-identified] : Balta is a 49 year old male here for routine postoperative visit. S/P laparoscopic sleeve gastrectomy, laparoscopic lysis of adhesions, and laparoscopic repair of ventral incisional hernia on 9/21/22.  He admits to reverting to some unhealthy eating habits, eating more bread, and not exercising regularly recently, which contributed to an 8 pound weight gain.  He reports no dysphagia or reflux.  He has no abdominal pain.  He is otherwise doing very well.

## 2024-03-11 ENCOUNTER — APPOINTMENT (OUTPATIENT)
Dept: SURGERY | Facility: CLINIC | Age: 49
End: 2024-03-11

## 2024-03-12 LAB
ALBUMIN SERPL ELPH-MCNC: 4.3 G/DL
ALP BLD-CCNC: 53 U/L
ALT SERPL-CCNC: 14 U/L
ANION GAP SERPL CALC-SCNC: 12 MMOL/L
AST SERPL-CCNC: 13 U/L
BILIRUB SERPL-MCNC: 0.7 MG/DL
BUN SERPL-MCNC: 24 MG/DL
CALCIUM SERPL-MCNC: 9.4 MG/DL
CHLORIDE SERPL-SCNC: 101 MMOL/L
CO2 SERPL-SCNC: 28 MMOL/L
CREAT SERPL-MCNC: 1.05 MG/DL
EGFR: 88 ML/MIN/1.73M2
GLUCOSE SERPL-MCNC: 84 MG/DL
POTASSIUM SERPL-SCNC: 3.4 MMOL/L
PROT SERPL-MCNC: 6.7 G/DL
SODIUM SERPL-SCNC: 141 MMOL/L

## 2024-03-12 RX ORDER — POTASSIUM CHLORIDE 1500 MG/1
20 TABLET, FILM COATED, EXTENDED RELEASE ORAL
Qty: 30 | Refills: 3 | Status: ACTIVE | COMMUNITY
Start: 2024-03-12 | End: 1900-01-01

## 2024-03-12 RX ORDER — POTASSIUM CHLORIDE 1.5 G/1.58G
20 POWDER, FOR SOLUTION ORAL DAILY
Qty: 7 | Refills: 1 | Status: DISCONTINUED | COMMUNITY
Start: 2024-02-02 | End: 2024-03-12

## 2024-03-14 ENCOUNTER — NON-APPOINTMENT (OUTPATIENT)
Age: 49
End: 2024-03-14

## 2024-03-21 ENCOUNTER — NON-APPOINTMENT (OUTPATIENT)
Age: 49
End: 2024-03-21

## 2024-03-22 ENCOUNTER — APPOINTMENT (OUTPATIENT)
Dept: INFECTIOUS DISEASE | Facility: CLINIC | Age: 49
End: 2024-03-22

## 2024-03-22 LAB
ALBUMIN SERPL ELPH-MCNC: 4.6 G/DL
ALP BLD-CCNC: 66 U/L
ALT SERPL-CCNC: 17 U/L
ANION GAP SERPL CALC-SCNC: 12 MMOL/L
AST SERPL-CCNC: 15 U/L
BILIRUB SERPL-MCNC: 0.6 MG/DL
BUN SERPL-MCNC: 16 MG/DL
CALCIUM SERPL-MCNC: 9.4 MG/DL
CHLORIDE SERPL-SCNC: 102 MMOL/L
CO2 SERPL-SCNC: 27 MMOL/L
CREAT SERPL-MCNC: 1.1 MG/DL
EGFR: 83 ML/MIN/1.73M2
GLUCOSE SERPL-MCNC: 76 MG/DL
HCT VFR BLD CALC: 40.4 %
HGB BLD-MCNC: 13.9 G/DL
MCHC RBC-ENTMCNC: 32.2 PG
MCHC RBC-ENTMCNC: 34.4 GM/DL
MCV RBC AUTO: 93.5 FL
PLATELET # BLD AUTO: 296 K/UL
POTASSIUM SERPL-SCNC: 3.8 MMOL/L
PROT SERPL-MCNC: 7.2 G/DL
RBC # BLD: 4.32 M/UL
RBC # FLD: 13.8 %
SODIUM SERPL-SCNC: 141 MMOL/L
WBC # FLD AUTO: 8.42 K/UL

## 2024-03-26 ENCOUNTER — APPOINTMENT (OUTPATIENT)
Dept: SURGERY | Facility: CLINIC | Age: 49
End: 2024-03-26
Payer: COMMERCIAL

## 2024-03-26 VITALS — HEIGHT: 70 IN | BODY MASS INDEX: 34.65 KG/M2 | WEIGHT: 242 LBS

## 2024-03-26 PROCEDURE — 99204 OFFICE O/P NEW MOD 45 MIN: CPT

## 2024-03-26 NOTE — HISTORY OF PRESENT ILLNESS
[Home] : at home, [unfilled] , at the time of the visit. [Medical Office: ([facility])___] : at the medical office located in  [Verbal consent obtained from patient] : the patient, [unfilled] [de-identified] : Balta is a 49 year old male here for routine postoperative visit. S/P laparoscopic sleeve gastrectomy, laparoscopic lysis of adhesions, and laparoscopic repair of ventral incisional hernia on 9/21/22.  He admits to reverting to some unhealthy eating habits, eating more bread, and not exercising regularly recently, which contributed to an 8 pound weight gain. He reports no dysphagia or reflux. [de-identified] : Patient is status post sleeve gastrectomy in September 2022.  He is initial preop weight was 320 pounds and he lost approximately 80 pounds to a lowest of 240.  Patient has a goal weight of 220 pounds.  Has begun to increase his physical activity and watch his total caloric intake and he reports a 7 pound weight loss since last seeing Dr. Chow 3 weeks ago.  We discussed obesity medicine options at this time and due to patient's uncontrolled hypertension he is not a candidate for phentermine.  Due to the national widespread shortages of any GLP-1 injection, I discussed trying to prescribe topiramate 25 to 50 mg nightly as tolerated to reduce with cravings.  Patient is amenable to this idea and will prescribe this medication accordingly.  I will follow-up with him every 2 months.  Patient verbalized understanding.

## 2024-03-26 NOTE — PHYSICAL EXAM
[Obese] : obese [Normal] : affect appropriate [de-identified] : Current BMI 34.7.  Limited due to telehealth visit.

## 2024-03-26 NOTE — ASSESSMENT
[FreeTextEntry1] : In summary patient is status post sleeve gastrectomy in September 2022 with 80 pound weight loss however patient is 20 pounds away from his goal weight.  He continues to lose weight however slowly.  He has increased his exercise regimen as well as watching his calories.  I will prescribe topiramate 25 to 50 mg nightly as tolerated.  We discussed benefits, risks and side effects including somnolence.  Patient verbalized understanding and wishes to proceed with medication.  I will see him in 2 months for follow-up.  Patient was instructed to contact me if he has any questions or concerns.

## 2024-04-02 ENCOUNTER — APPOINTMENT (OUTPATIENT)
Dept: CARDIOLOGY | Facility: CLINIC | Age: 49
End: 2024-04-02

## 2024-04-03 NOTE — PHYSICAL EXAM
[Obese, well nourished, in no acute distress] : obese, well nourished, in no acute distress [Normal] : PERRL, EOMI, no conjunctival infection, anicteric Patient's belongings returned

## 2024-04-04 ENCOUNTER — APPOINTMENT (OUTPATIENT)
Dept: CARDIOLOGY | Facility: CLINIC | Age: 49
End: 2024-04-04
Payer: COMMERCIAL

## 2024-04-04 ENCOUNTER — LABORATORY RESULT (OUTPATIENT)
Age: 49
End: 2024-04-04

## 2024-04-04 ENCOUNTER — NON-APPOINTMENT (OUTPATIENT)
Age: 49
End: 2024-04-04

## 2024-04-04 VITALS
SYSTOLIC BLOOD PRESSURE: 114 MMHG | HEART RATE: 59 BPM | BODY MASS INDEX: 34.36 KG/M2 | WEIGHT: 240 LBS | HEIGHT: 70 IN | DIASTOLIC BLOOD PRESSURE: 80 MMHG | OXYGEN SATURATION: 97 % | TEMPERATURE: 97.7 F

## 2024-04-04 DIAGNOSIS — I25.10 ATHEROSCLEROTIC HEART DISEASE OF NATIVE CORONARY ARTERY W/OUT ANGINA PECTORIS: ICD-10-CM

## 2024-04-04 DIAGNOSIS — Z00.00 ENCOUNTER FOR GENERAL ADULT MEDICAL EXAMINATION W/OUT ABNORMAL FINDINGS: ICD-10-CM

## 2024-04-04 DIAGNOSIS — E78.5 HYPERLIPIDEMIA, UNSPECIFIED: ICD-10-CM

## 2024-04-04 DIAGNOSIS — R93.1 ABNORMAL FINDINGS ON DIAGNOSTIC IMAGING OF HEART AND CORONARY CIRCULATION: ICD-10-CM

## 2024-04-04 DIAGNOSIS — Z22.7 LATENT TUBERCULOSIS: ICD-10-CM

## 2024-04-04 DIAGNOSIS — E87.6 HYPOKALEMIA: ICD-10-CM

## 2024-04-04 DIAGNOSIS — R09.81 NASAL CONGESTION: ICD-10-CM

## 2024-04-04 PROCEDURE — 93000 ELECTROCARDIOGRAM COMPLETE: CPT

## 2024-04-04 PROCEDURE — 99214 OFFICE O/P EST MOD 30 MIN: CPT | Mod: 25

## 2024-04-04 RX ORDER — PREDNISONE 20 MG/1
20 TABLET ORAL DAILY
Qty: 5 | Refills: 0 | Status: ACTIVE | COMMUNITY
Start: 2024-04-04 | End: 1900-01-01

## 2024-04-04 RX ORDER — ROSUVASTATIN CALCIUM 5 MG/1
5 TABLET, FILM COATED ORAL
Qty: 30 | Refills: 3 | Status: ACTIVE | COMMUNITY
Start: 2024-04-04 | End: 1900-01-01

## 2024-04-04 RX ORDER — AZITHROMYCIN 250 MG/1
250 TABLET, FILM COATED ORAL DAILY
Qty: 1 | Refills: 0 | Status: ACTIVE | COMMUNITY
Start: 2024-04-04 | End: 1900-01-01

## 2024-04-04 NOTE — HISTORY OF PRESENT ILLNESS
[FreeTextEntry1] : ALICJA ROSALES  is a 49 year old M w/ pmhx of  CAD Agatston 15 LUCIO 85%, HLD, HTN, Obesity  who presents today for annual physical exam. Recently pt was found to have hypokalemia and is currently taking potassium QD. Pt also endorses congestion for which he was seen at urgent care with a negative COVID swab. Was Rxd Loratadine. Pt still with symptoms today.   The patient denies fever, chills, sore throat, loss of taste or smell, muscle aches weight loss, malaise, rash, recent travel, insect bites, alteration bowel habits, headaches, weakness, abdominal  pain, bloating, changes in urination, visual disturbances, shortness of breath, chest pain, dizziness, palpitations.  The patient presents for routine follow up of their coronary artery disease.   The patient has been following all secondary prevents measures and antiplatelet therapy. The patient denies shortness of breath, chest pain, dizziness, palpitations.  The patient is here for follow-up of elevated cholesterol. Patient is currently tolerating medication and denies muscle pain, joint pain, back pain,  urinary changes , nausea, vomiting, abdominal pain or diarrhea. The patient is trying to follow a low cholesterol diet.  The patient here for evaluation of high blood pressure. Patient is currently tolerating the current antihypertensive regime and they deny headaches, stiff neck, visual changes, or PND. The patient has been trying to stay on a low-sodium diet.

## 2024-04-05 LAB
25(OH)D3 SERPL-MCNC: 38.9 NG/ML
ALBUMIN SERPL ELPH-MCNC: 4.5 G/DL
ALP BLD-CCNC: 59 U/L
ALT SERPL-CCNC: 14 U/L
ANION GAP SERPL CALC-SCNC: 17 MMOL/L
APPEARANCE: CLEAR
AST SERPL-CCNC: 8 U/L
BACTERIA: NEGATIVE /HPF
BASOPHILS # BLD AUTO: 0.08 K/UL
BASOPHILS NFR BLD AUTO: 0.7 %
BILIRUB DIRECT SERPL-MCNC: 0.1 MG/DL
BILIRUB INDIRECT SERPL-MCNC: 0.3 MG/DL
BILIRUB SERPL-MCNC: 0.4 MG/DL
BILIRUBIN URINE: NEGATIVE
BLOOD URINE: NEGATIVE
BUN SERPL-MCNC: 17 MG/DL
CALCIUM SERPL-MCNC: 9.3 MG/DL
CALCIUM SERPL-MCNC: 9.4 MG/DL
CAST: 0 /LPF
CHLORIDE SERPL-SCNC: 105 MMOL/L
CHOLEST SERPL-MCNC: 178 MG/DL
CK SERPL-CCNC: 95 U/L
CO2 SERPL-SCNC: 20 MMOL/L
COLOR: NORMAL
CREAT SERPL-MCNC: 1.2 MG/DL
EGFR: 75 ML/MIN/1.73M2
EOSINOPHIL # BLD AUTO: 0.19 K/UL
EOSINOPHIL NFR BLD AUTO: 1.6 %
EPITHELIAL CELLS: 1 /HPF
ESTIMATED AVERAGE GLUCOSE: 91 MG/DL
FERRITIN SERPL-MCNC: 120 NG/ML
FOLATE SERPL-MCNC: 11.8 NG/ML
GLUCOSE QUALITATIVE U: NEGATIVE MG/DL
GLUCOSE SERPL-MCNC: 81 MG/DL
HBA1C MFR BLD HPLC: 4.8 %
HCT VFR BLD CALC: 40.5 %
HDLC SERPL-MCNC: 47 MG/DL
HGB BLD-MCNC: 13.9 G/DL
IMM GRANULOCYTES NFR BLD AUTO: 0.4 %
IRON SATN MFR SERPL: 24 %
IRON SERPL-MCNC: 67 UG/DL
KETONES URINE: ABNORMAL MG/DL
LDLC SERPL CALC-MCNC: 107 MG/DL
LDLC SERPL DIRECT ASSAY-MCNC: 106 MG/DL
LEUKOCYTE ESTERASE URINE: NEGATIVE
LYMPHOCYTES # BLD AUTO: 2.21 K/UL
LYMPHOCYTES NFR BLD AUTO: 18.9 %
MAGNESIUM SERPL-MCNC: 2.3 MG/DL
MAN DIFF?: NORMAL
MCHC RBC-ENTMCNC: 32 PG
MCHC RBC-ENTMCNC: 34.3 GM/DL
MCV RBC AUTO: 93.1 FL
MICROSCOPIC-UA: NORMAL
MONOCYTES # BLD AUTO: 0.81 K/UL
MONOCYTES NFR BLD AUTO: 6.9 %
NEUTROPHILS # BLD AUTO: 8.37 K/UL
NEUTROPHILS NFR BLD AUTO: 71.5 %
NITRITE URINE: NEGATIVE
NONHDLC SERPL-MCNC: 131 MG/DL
OSMOLALITY SERPL: 296 MOSMOL/KG
PARATHYROID HORMONE INTACT: 45 PG/ML
PH URINE: 6
PHOSPHATE SERPL-MCNC: 3.1 MG/DL
PLATELET # BLD AUTO: 279 K/UL
POTASSIUM SERPL-SCNC: 3.9 MMOL/L
PROT SERPL-MCNC: 7.2 G/DL
PROTEIN URINE: 30 MG/DL
PSA SERPL-MCNC: 0.28 NG/ML
RAPID RVP RESULT: DETECTED
RBC # BLD: 4.35 M/UL
RBC # FLD: 14.2 %
RED BLOOD CELLS URINE: 2 /HPF
RV+EV RNA SPEC QL NAA+PROBE: DETECTED
SARS-COV-2 RNA PNL RESP NAA+PROBE: NOT DETECTED
SODIUM SERPL-SCNC: 142 MMOL/L
SPECIFIC GRAVITY URINE: 1.02
T3RU NFR SERPL: 1.1 TBI
T4 FREE SERPL-MCNC: 1.2 NG/DL
T4 SERPL-MCNC: 7 UG/DL
TIBC SERPL-MCNC: 277 UG/DL
TRANSFERRIN SERPL-MCNC: 212 MG/DL
TRIGL SERPL-MCNC: 138 MG/DL
TSH SERPL-ACNC: 2.07 UIU/ML
UIBC SERPL-MCNC: 210 UG/DL
URATE SERPL-MCNC: 6.3 MG/DL
UROBILINOGEN URINE: 0.2 MG/DL
VIT B12 SERPL-MCNC: 430 PG/ML
WBC # FLD AUTO: 11.71 K/UL
WHITE BLOOD CELLS URINE: 0 /HPF

## 2024-04-08 LAB
SELENIUM SERPL-MCNC: 124 UG/L
VIT B6 SERPL-MCNC: 10.9 UG/L

## 2024-04-09 LAB — VIT B1 SERPL-MCNC: 160.3 NMOL/L

## 2024-04-11 LAB
A-TOCOPHEROL VIT E SERPL-MCNC: 9.7 MG/L
BETA CAROTENE: 4 UG/DL
BETA+GAMMA TOCOPHEROL SERPL-MCNC: 1.2 MG/L
VIT A SERPL-MCNC: 55.5 UG/DL
VIT C SERPL-MCNC: 0.7 MG/DL
ZINC SERPL-MCNC: 134 UG/DL

## 2024-05-06 ENCOUNTER — APPOINTMENT (OUTPATIENT)
Dept: CARDIOLOGY | Facility: CLINIC | Age: 49
End: 2024-05-06

## 2024-05-15 ENCOUNTER — NON-APPOINTMENT (OUTPATIENT)
Age: 49
End: 2024-05-15

## 2024-06-02 ENCOUNTER — RX RENEWAL (OUTPATIENT)
Age: 49
End: 2024-06-02

## 2024-06-02 RX ORDER — TOPIRAMATE 25 MG/1
25 TABLET, FILM COATED ORAL
Qty: 60 | Refills: 1 | Status: ACTIVE | COMMUNITY
Start: 2024-03-26 | End: 1900-01-01

## 2024-06-04 NOTE — HISTORY OF PRESENT ILLNESS
[de-identified] : Balta is a 49 year old male here for a three month visit. S/P laparoscopic sleeve gastrectomy, laparoscopic lysis of adhesions, and laparoscopic repair of ventral incisional hernia on 9/21/22.

## 2024-06-07 ENCOUNTER — APPOINTMENT (OUTPATIENT)
Dept: SURGERY | Facility: CLINIC | Age: 49
End: 2024-06-07

## 2024-08-21 ENCOUNTER — RX RENEWAL (OUTPATIENT)
Age: 49
End: 2024-08-21

## 2024-10-08 ENCOUNTER — APPOINTMENT (OUTPATIENT)
Dept: CARDIOLOGY | Facility: CLINIC | Age: 49
End: 2024-10-08
Payer: COMMERCIAL

## 2024-10-08 ENCOUNTER — NON-APPOINTMENT (OUTPATIENT)
Age: 49
End: 2024-10-08

## 2024-10-08 VITALS
SYSTOLIC BLOOD PRESSURE: 160 MMHG | WEIGHT: 244 LBS | HEIGHT: 70 IN | DIASTOLIC BLOOD PRESSURE: 120 MMHG | HEART RATE: 58 BPM | BODY MASS INDEX: 34.93 KG/M2 | OXYGEN SATURATION: 98 %

## 2024-10-08 DIAGNOSIS — R06.09 OTHER FORMS OF DYSPNEA: ICD-10-CM

## 2024-10-08 DIAGNOSIS — Z98.84 BARIATRIC SURGERY STATUS: ICD-10-CM

## 2024-10-08 DIAGNOSIS — R35.0 FREQUENCY OF MICTURITION: ICD-10-CM

## 2024-10-08 DIAGNOSIS — R93.1 ABNORMAL FINDINGS ON DIAGNOSTIC IMAGING OF HEART AND CORONARY CIRCULATION: ICD-10-CM

## 2024-10-08 DIAGNOSIS — Z23 ENCOUNTER FOR IMMUNIZATION: ICD-10-CM

## 2024-10-08 DIAGNOSIS — I25.10 ATHEROSCLEROTIC HEART DISEASE OF NATIVE CORONARY ARTERY W/OUT ANGINA PECTORIS: ICD-10-CM

## 2024-10-08 PROCEDURE — 90656 IIV3 VACC NO PRSV 0.5 ML IM: CPT

## 2024-10-08 PROCEDURE — 99214 OFFICE O/P EST MOD 30 MIN: CPT | Mod: 25

## 2024-10-08 PROCEDURE — G0008: CPT

## 2024-10-08 PROCEDURE — 93000 ELECTROCARDIOGRAM COMPLETE: CPT

## 2024-10-08 RX ORDER — INFLUENZA A VIRUS A/VICTORIA/4897/2022 IVR-238 (H1N1) ANTIGEN (FORMALDEHYDE INACTIVATED), INFLUENZA A VIRUS A/CALIFORNIA/122/2022 SAN-022 (H3N2) ANTIGEN (FORMALDEHYDE INACTIVATED), AND INFLUENZA B VIRUS B/MICHIGAN/01/2021 ANTIGEN (FORMALDEHYDE INACTIVATED) 15; 15; 15 MG/.5ML; MG/.5ML; MG/.5ML
0.5 INJECTION, SUSPENSION INTRAMUSCULAR
Qty: 1 | Refills: 0 | Status: DISCONTINUED | COMMUNITY
Start: 2024-10-08 | End: 2024-10-08

## 2024-10-08 NOTE — DIETITIAN INITIAL EVALUATION ADULT - CALCULATED TO (ML/KG)
----- Message from Med Assistant Starr sent at 10/7/2024  2:50 PM CDT -----  Regarding: Reschedule Mammogram location  Kervin Arroyo,  Patient wants to change location. Can you reschedule her Mammogram?   Thank you,  Genevieve   [phone]

## 2024-10-09 ENCOUNTER — LABORATORY RESULT (OUTPATIENT)
Age: 49
End: 2024-10-09

## 2024-10-09 ENCOUNTER — NON-APPOINTMENT (OUTPATIENT)
Age: 49
End: 2024-10-09

## 2024-10-10 ENCOUNTER — NON-APPOINTMENT (OUTPATIENT)
Age: 49
End: 2024-10-10

## 2024-10-10 LAB
ALBUMIN SERPL ELPH-MCNC: 4.4 G/DL
ALP BLD-CCNC: 59 U/L
ALT SERPL-CCNC: 14 U/L
ANION GAP SERPL CALC-SCNC: 15 MMOL/L
AST SERPL-CCNC: 15 U/L
BILIRUB SERPL-MCNC: 0.7 MG/DL
BUN SERPL-MCNC: 12 MG/DL
CALCIUM SERPL-MCNC: 9.6 MG/DL
CHLORIDE SERPL-SCNC: 101 MMOL/L
CO2 SERPL-SCNC: 27 MMOL/L
CREAT SERPL-MCNC: 1.03 MG/DL
EGFR: 90 ML/MIN/1.73M2
GLUCOSE SERPL-MCNC: 95 MG/DL
POTASSIUM SERPL-SCNC: 3.1 MMOL/L
PROT SERPL-MCNC: 6.9 G/DL
SODIUM SERPL-SCNC: 143 MMOL/L

## 2024-10-11 LAB
25(OH)D3 SERPL-MCNC: 25.5 NG/ML
APPEARANCE: CLEAR
BACTERIA: NEGATIVE /HPF
BASOPHILS # BLD AUTO: 0.08 K/UL
BASOPHILS NFR BLD AUTO: 0.8 %
BILIRUB DIRECT SERPL-MCNC: 0.2 MG/DL
BILIRUB INDIRECT SERPL-MCNC: 0.5 MG/DL
BILIRUBIN URINE: NEGATIVE
BLOOD URINE: NEGATIVE
CALCIUM SERPL-MCNC: 9.6 MG/DL
CAST: 0 /LPF
CHOLEST SERPL-MCNC: 152 MG/DL
CK SERPL-CCNC: 108 U/L
COLOR: YELLOW
CREAT SPEC-SCNC: 152 MG/DL
EOSINOPHIL # BLD AUTO: 0.17 K/UL
EOSINOPHIL NFR BLD AUTO: 1.7 %
EPITHELIAL CELLS: 0 /HPF
ESTIMATED AVERAGE GLUCOSE: 97 MG/DL
FOLATE SERPL-MCNC: 13 NG/ML
GLUCOSE QUALITATIVE U: NEGATIVE MG/DL
HBA1C MFR BLD HPLC: 5 %
HCT VFR BLD CALC: 41.7 %
HDLC SERPL-MCNC: 46 MG/DL
HGB BLD-MCNC: 14 G/DL
IMM GRANULOCYTES NFR BLD AUTO: 0.5 %
KETONES URINE: NEGATIVE MG/DL
LDLC SERPL CALC-MCNC: 82 MG/DL
LEUKOCYTE ESTERASE URINE: NEGATIVE
LYMPHOCYTES # BLD AUTO: 2.08 K/UL
LYMPHOCYTES NFR BLD AUTO: 20.6 %
MAGNESIUM SERPL-MCNC: 2 MG/DL
MAN DIFF?: NORMAL
MCHC RBC-ENTMCNC: 32.4 PG
MCHC RBC-ENTMCNC: 33.6 GM/DL
MCV RBC AUTO: 96.5 FL
MICROALBUMIN 24H UR DL<=1MG/L-MCNC: 6.1 MG/DL
MICROALBUMIN/CREAT 24H UR-RTO: 40 MG/G
MICROSCOPIC-UA: NORMAL
MONOCYTES # BLD AUTO: 0.86 K/UL
MONOCYTES NFR BLD AUTO: 8.5 %
NEUTROPHILS # BLD AUTO: 6.85 K/UL
NEUTROPHILS NFR BLD AUTO: 67.9 %
NITRITE URINE: NEGATIVE
NONHDLC SERPL-MCNC: 107 MG/DL
OSMOLALITY SERPL: 294 MOSMOL/KG
PARATHYROID HORMONE INTACT: 80 PG/ML
PH URINE: 6.5
PHOSPHATE SERPL-MCNC: 3.2 MG/DL
PLATELET # BLD AUTO: 251 K/UL
PROTEIN URINE: 30 MG/DL
RBC # BLD: 4.32 M/UL
RBC # FLD: 15 %
RED BLOOD CELLS URINE: 0 /HPF
SPECIFIC GRAVITY URINE: 1.02
T3RU NFR SERPL: 1 TBI
T4 FREE SERPL-MCNC: 1.4 NG/DL
T4 SERPL-MCNC: 9 UG/DL
TRIGL SERPL-MCNC: 139 MG/DL
TSH SERPL-ACNC: 2.06 UIU/ML
URATE SERPL-MCNC: 7.1 MG/DL
UROBILINOGEN URINE: 0.2 MG/DL
VIT B12 SERPL-MCNC: 453 PG/ML
WBC # FLD AUTO: 10.09 K/UL
WHITE BLOOD CELLS URINE: 0 /HPF

## 2024-10-14 LAB — VIT B1 SERPL-MCNC: 158.4 NMOL/L

## 2024-10-17 LAB — VIT A SERPL-MCNC: 61 UG/DL

## 2024-10-22 ENCOUNTER — APPOINTMENT (OUTPATIENT)
Dept: PULMONOLOGY | Facility: CLINIC | Age: 49
End: 2024-10-22

## 2024-10-28 ENCOUNTER — APPOINTMENT (OUTPATIENT)
Dept: UROLOGY | Facility: CLINIC | Age: 49
End: 2024-10-28

## 2025-01-29 ENCOUNTER — NON-APPOINTMENT (OUTPATIENT)
Age: 50
End: 2025-01-29

## 2025-02-19 ENCOUNTER — TRANSCRIPTION ENCOUNTER (OUTPATIENT)
Age: 50
End: 2025-02-19

## 2025-02-26 ENCOUNTER — NON-APPOINTMENT (OUTPATIENT)
Age: 50
End: 2025-02-26

## 2025-02-26 ENCOUNTER — APPOINTMENT (OUTPATIENT)
Dept: NEPHROLOGY | Facility: CLINIC | Age: 50
End: 2025-02-26
Payer: COMMERCIAL

## 2025-02-26 VITALS
SYSTOLIC BLOOD PRESSURE: 140 MMHG | BODY MASS INDEX: 35.93 KG/M2 | WEIGHT: 251 LBS | OXYGEN SATURATION: 95 % | TEMPERATURE: 97.8 F | HEIGHT: 70 IN | DIASTOLIC BLOOD PRESSURE: 96 MMHG | HEART RATE: 61 BPM

## 2025-02-26 DIAGNOSIS — E26.09 OTHER PRIMARY HYPERALDOSTERONISM: ICD-10-CM

## 2025-02-26 DIAGNOSIS — R80.9 PROTEINURIA, UNSPECIFIED: ICD-10-CM

## 2025-02-26 DIAGNOSIS — E21.3 HYPERPARATHYROIDISM, UNSPECIFIED: ICD-10-CM

## 2025-02-26 DIAGNOSIS — I10 ESSENTIAL (PRIMARY) HYPERTENSION: ICD-10-CM

## 2025-02-26 DIAGNOSIS — I15.9 SECONDARY HYPERTENSION, UNSPECIFIED: ICD-10-CM

## 2025-02-26 PROCEDURE — G2211 COMPLEX E/M VISIT ADD ON: CPT | Mod: NC

## 2025-02-26 PROCEDURE — 99205 OFFICE O/P NEW HI 60 MIN: CPT

## 2025-02-26 RX ORDER — EPLERENONE 50 MG/1
50 TABLET, COATED ORAL 3 TIMES DAILY
Qty: 90 | Refills: 3 | Status: ACTIVE | COMMUNITY
Start: 2025-02-26 | End: 1900-01-01

## 2025-02-27 LAB
25(OH)D3 SERPL-MCNC: 22 NG/ML
ALBUMIN SERPL ELPH-MCNC: 4.2 G/DL
ALDOSTERONE SERUM: 28.9 NG/DL
ANION GAP SERPL CALC-SCNC: 17 MMOL/L
APPEARANCE: CLEAR
BACTERIA: NEGATIVE /HPF
BILIRUBIN URINE: NEGATIVE
BLOOD URINE: NEGATIVE
BUN SERPL-MCNC: 15 MG/DL
CALCIUM SERPL-MCNC: 9.2 MG/DL
CALCIUM SERPL-MCNC: 9.2 MG/DL
CAST: 0 /LPF
CHLORIDE SERPL-SCNC: 100 MMOL/L
CO2 SERPL-SCNC: 26 MMOL/L
COLOR: NORMAL
CREAT SERPL-MCNC: 1.05 MG/DL
CREAT SPEC-SCNC: 227 MG/DL
CREAT/PROT UR: 0.2 RATIO
EGFR: 87 ML/MIN/1.73M2
EPITHELIAL CELLS: 0 /HPF
GLUCOSE QUALITATIVE U: 100 MG/DL
GLUCOSE SERPL-MCNC: 66 MG/DL
KETONES URINE: ABNORMAL MG/DL
LEUKOCYTE ESTERASE URINE: NEGATIVE
MICROSCOPIC-UA: NORMAL
NITRITE URINE: NEGATIVE
OSMOLALITY SERPL: 295 MOSMOL/KG
PARATHYROID HORMONE INTACT: 53 PG/ML
PH URINE: 6.5
PHOSPHATE SERPL-MCNC: 2.4 MG/DL
POTASSIUM SERPL-SCNC: 3.5 MMOL/L
POTASSIUM UR-SCNC: 58.4 MMOL/L
PROT UR-MCNC: 38 MG/DL
PROTEIN URINE: 30 MG/DL
RED BLOOD CELLS URINE: 3 /HPF
SODIUM SERPL-SCNC: 143 MMOL/L
SPECIFIC GRAVITY URINE: 1.02
UROBILINOGEN URINE: 0.2 MG/DL
WHITE BLOOD CELLS URINE: 0 /HPF

## 2025-02-28 LAB — OSMOLALITY UR: 621 MOSM/KG

## 2025-03-06 LAB — RENIN ACTIVITY, PLASMA: 0.22 NG/ML/HR

## 2025-03-14 ENCOUNTER — APPOINTMENT (OUTPATIENT)
Dept: ULTRASOUND IMAGING | Facility: CLINIC | Age: 50
End: 2025-03-14
Payer: COMMERCIAL

## 2025-03-14 ENCOUNTER — OUTPATIENT (OUTPATIENT)
Dept: OUTPATIENT SERVICES | Facility: HOSPITAL | Age: 50
LOS: 1 days | End: 2025-03-14
Payer: COMMERCIAL

## 2025-03-14 DIAGNOSIS — Z87.81 PERSONAL HISTORY OF (HEALED) TRAUMATIC FRACTURE: Chronic | ICD-10-CM

## 2025-03-14 DIAGNOSIS — Z90.49 ACQUIRED ABSENCE OF OTHER SPECIFIED PARTS OF DIGESTIVE TRACT: Chronic | ICD-10-CM

## 2025-03-14 DIAGNOSIS — S43.006A UNSPECIFIED DISLOCATION OF UNSPECIFIED SHOULDER JOINT, INITIAL ENCOUNTER: Chronic | ICD-10-CM

## 2025-03-14 DIAGNOSIS — Z00.8 ENCOUNTER FOR OTHER GENERAL EXAMINATION: ICD-10-CM

## 2025-03-14 DIAGNOSIS — T14.8XXA OTHER INJURY OF UNSPECIFIED BODY REGION, INITIAL ENCOUNTER: Chronic | ICD-10-CM

## 2025-03-14 PROCEDURE — 93975 VASCULAR STUDY: CPT

## 2025-03-14 PROCEDURE — 93975 VASCULAR STUDY: CPT | Mod: 26

## 2025-05-13 ENCOUNTER — APPOINTMENT (OUTPATIENT)
Dept: NEPHROLOGY | Facility: CLINIC | Age: 50
End: 2025-05-13

## 2025-08-26 ENCOUNTER — APPOINTMENT (OUTPATIENT)
Dept: CARDIOLOGY | Facility: CLINIC | Age: 50
End: 2025-08-26

## (undated) DEVICE — SYR ASEPTO

## (undated) DEVICE — VENODYNE/SCD SLEEVE CALF LARGE

## (undated) DEVICE — SHEARS COVIDIEN ENDO SHEAR 5MM X 45CM LONG W MONOPOLAR CAUTERY

## (undated) DEVICE — GLV 6.5 PROTEXIS (WHITE)

## (undated) DEVICE — INSUFFLATION NDL COVIDIEN STEP 14G FOR STEP/VERSASTEP

## (undated) DEVICE — COVIDIEN SIGNIA POWER CONTROL SHELL

## (undated) DEVICE — PREP CHLORAPREP HI-LITE ORANGE 26ML

## (undated) DEVICE — TUBING STRYKEFLOW II SUCTION / IRRIGATOR

## (undated) DEVICE — TUBING CAP SET ERBEFLO CLEVERCAP HYBRID CO2 FOR OLYMPUS SCOPES AND UCR

## (undated) DEVICE — DRAIN PENROSE .25" X 18" LATEX

## (undated) DEVICE — SUT ETHIBOND 0 44" EN3

## (undated) DEVICE — DRAPE MAYO STAND 30"

## (undated) DEVICE — TROCAR COVIDIEN VERSAPORT BLADELESS OPTICAL 5MM STANDARD

## (undated) DEVICE — TUBING SUCTION 20FT

## (undated) DEVICE — SOL IRR POUR H2O 250ML

## (undated) DEVICE — WARMING BLANKET UPPER ADULT

## (undated) DEVICE — SUT MONOCRYL 4-0 18" PS-2

## (undated) DEVICE — TUBING INSUFFLATION LAP FILTER 10FT

## (undated) DEVICE — DRAPE TOWEL BLUE 17" X 24"

## (undated) DEVICE — PACK ADVANCED LAPAROSCOPIC NS

## (undated) DEVICE — MEDICATION LABELS W MARKER

## (undated) DEVICE — SPECIMEN CONTAINER 100ML

## (undated) DEVICE — LIGASURE MARYLAND 44CM

## (undated) DEVICE — GOWN TRIMAX LG

## (undated) DEVICE — TAPE SILK 3"

## (undated) DEVICE — SOL IRR POUR NS 0.9% 500ML

## (undated) DEVICE — D HELP - CLEARVIEW CLEARIFY SYSTEM

## (undated) DEVICE — GLV 7 PROTEXIS (WHITE)

## (undated) DEVICE — TUBING HYBRID CO2

## (undated) DEVICE — SUT POLYSORB 0 60" TIES UNDYED

## (undated) DEVICE — DRSG STERISTRIPS 0.5 X 4"

## (undated) DEVICE — TROCAR COVIDIEN VERSAPORT BLADELESS OPTICAL 12MM STANDARD

## (undated) DEVICE — POSITIONER FOAM EGG CRATE ULNAR 2PCS (PINK)

## (undated) DEVICE — DRSG MASTISOL

## (undated) DEVICE — IRRIGATION TRAY W PISTON SYRINGE 60ML

## (undated) DEVICE — VISIGI 3D SLEEVE GASTRECTOMY 36FR

## (undated) DEVICE — GOWN XL EXTRA LONG

## (undated) DEVICE — TUBING PLUME AWAY 4.0

## (undated) DEVICE — GLV 7 PROTEXIS (BLUE)

## (undated) DEVICE — SUT ETHIBOND 2-0 44" EN3

## (undated) DEVICE — TROCAR COVIDIEN VERSAPORT BLADELESS OPTICAL 15MM STANDARD